# Patient Record
Sex: MALE | Race: WHITE | Employment: OTHER | URBAN - NONMETROPOLITAN AREA
[De-identification: names, ages, dates, MRNs, and addresses within clinical notes are randomized per-mention and may not be internally consistent; named-entity substitution may affect disease eponyms.]

---

## 2017-01-05 ENCOUNTER — HOSPITAL ENCOUNTER (OUTPATIENT)
Dept: GENERAL RADIOLOGY | Age: 77
Discharge: HOME | End: 2017-01-05
Payer: MEDICARE

## 2017-01-05 ENCOUNTER — HOSPITAL ENCOUNTER (OUTPATIENT)
Dept: PREADMISSION TESTING | Age: 77
Discharge: HOME | End: 2017-01-05
Payer: MEDICARE

## 2017-01-05 VITALS — HEIGHT: 72 IN | WEIGHT: 230 LBS | BODY MASS INDEX: 31.15 KG/M2

## 2017-01-05 LAB
ALBUMIN SERPL-MCNC: 4 G/DL (ref 3.5–5.2)
ALP BLD-CCNC: 104 U/L (ref 40–130)
ALT SERPL-CCNC: 15 U/L (ref 5–41)
ANION GAP SERPL CALCULATED.3IONS-SCNC: 16 MMOL/L (ref 7–19)
APTT: 27.6 SEC (ref 26–36.2)
AST SERPL-CCNC: 15 U/L (ref 5–40)
BASOPHILS ABSOLUTE: 0 K/UL (ref 0–0.2)
BASOPHILS RELATIVE PERCENT: 0.3 % (ref 0–1)
BILIRUB SERPL-MCNC: 0.4 MG/DL (ref 0.2–1.2)
BILIRUBIN URINE: NEGATIVE
BLOOD, URINE: NEGATIVE
BUN BLDV-MCNC: 21 MG/DL (ref 8–23)
CALCIUM SERPL-MCNC: 9.5 MG/DL (ref 8.8–10.2)
CHLORIDE BLD-SCNC: 98 MMOL/L (ref 98–111)
CLARITY: CLEAR
CO2: 26 MMOL/L (ref 22–29)
COLOR: YELLOW
CREAT SERPL-MCNC: 1.2 MG/DL (ref 0.5–1.2)
EOSINOPHILS ABSOLUTE: 0.3 K/UL (ref 0–0.6)
EOSINOPHILS RELATIVE PERCENT: 2.7 % (ref 0–5)
GFR NON-AFRICAN AMERICAN: 59
GLOBULIN: 3.6 G/DL
GLUCOSE BLD-MCNC: 102 MG/DL (ref 74–109)
GLUCOSE URINE: NEGATIVE MG/DL
HCT VFR BLD CALC: 37.3 % (ref 42–52)
HEMOGLOBIN: 11.6 G/DL (ref 14–18)
INR BLD: 1.05 (ref 0.88–1.18)
KETONES, URINE: NEGATIVE MG/DL
LEUKOCYTE ESTERASE, URINE: NEGATIVE
LYMPHOCYTES ABSOLUTE: 2.1 K/UL (ref 1.1–4.5)
LYMPHOCYTES RELATIVE PERCENT: 22.4 % (ref 20–40)
MCH RBC QN AUTO: 28.8 PG (ref 27–31)
MCHC RBC AUTO-ENTMCNC: 31.1 G/DL (ref 33–37)
MCV RBC AUTO: 92.6 FL (ref 80–94)
MONOCYTES ABSOLUTE: 0.6 K/UL (ref 0–0.9)
MONOCYTES RELATIVE PERCENT: 6.4 % (ref 0–10)
NEUTROPHILS ABSOLUTE: 6.4 K/UL (ref 1.5–7.5)
NEUTROPHILS RELATIVE PERCENT: 66.7 % (ref 50–65)
NITRITE, URINE: NEGATIVE
PDW BLD-RTO: 15.4 % (ref 11.5–14.5)
PH UA: 5.5
PLATELET # BLD: 288 K/UL (ref 130–400)
PMV BLD AUTO: 10.2 FL (ref 7.4–10.4)
POTASSIUM SERPL-SCNC: 4.1 MMOL/L (ref 3.5–5)
PROTEIN UA: NEGATIVE MG/DL
PROTHROMBIN TIME: 13.7 SEC (ref 12–14.6)
RBC # BLD: 4.03 M/UL (ref 4.7–6.1)
SODIUM BLD-SCNC: 140 MMOL/L (ref 136–145)
SPECIFIC GRAVITY UA: 1.02
TOTAL PROTEIN: 7.6 G/DL (ref 6.6–8.7)
UROBILINOGEN, URINE: 0.2 E.U./DL
WBC # BLD: 9.6 K/UL (ref 4.8–10.8)

## 2017-01-05 PROCEDURE — 87070 CULTURE OTHR SPECIMN AEROBIC: CPT

## 2017-01-05 PROCEDURE — 36415 COLL VENOUS BLD VENIPUNCTURE: CPT

## 2017-01-05 PROCEDURE — 80053 COMPREHEN METABOLIC PANEL: CPT

## 2017-01-05 PROCEDURE — 71020 XR CHEST STANDARD TWO VW: CPT

## 2017-01-05 PROCEDURE — 85025 COMPLETE CBC W/AUTO DIFF WBC: CPT

## 2017-01-05 PROCEDURE — 93005 ELECTROCARDIOGRAM TRACING: CPT

## 2017-01-05 PROCEDURE — 85610 PROTHROMBIN TIME: CPT

## 2017-01-05 PROCEDURE — 81003 URINALYSIS AUTO W/O SCOPE: CPT

## 2017-01-05 PROCEDURE — 85730 THROMBOPLASTIN TIME PARTIAL: CPT

## 2017-01-05 RX ORDER — DEXAMETHASONE SODIUM PHOSPHATE 10 MG/ML
10 INJECTION INTRAMUSCULAR; INTRAVENOUS ONCE
Status: CANCELLED | OUTPATIENT
Start: 2017-01-16

## 2017-01-05 RX ORDER — CELECOXIB 200 MG/1
200 CAPSULE ORAL ONCE
Status: CANCELLED | OUTPATIENT
Start: 2017-01-16

## 2017-01-05 RX ORDER — PREGABALIN 75 MG/1
75 CAPSULE ORAL ONCE
Status: CANCELLED | OUTPATIENT
Start: 2017-01-16

## 2017-01-06 LAB — MRSA CULTURE ONLY: NORMAL

## 2017-01-08 LAB
EKG P AXIS: 58 DEGREES
EKG P-R INTERVAL: 200 MS
EKG Q-T INTERVAL: 408 MS
EKG QRS DURATION: 142 MS
EKG QTC CALCULATION (BAZETT): 443 MS
EKG T AXIS: 69 DEGREES

## 2017-01-11 ENCOUNTER — CLINICAL SUPPORT (OUTPATIENT)
Dept: AUDIOLOGY | Facility: CLINIC | Age: 77
End: 2017-01-11

## 2017-01-11 DIAGNOSIS — Z46.1 ENCOUNTER FOR FITTING OR ADJUSTMENT OF HEARING AID: Primary | ICD-10-CM

## 2017-01-11 PROCEDURE — HEARINGNOCHG: Performed by: AUDIOLOGIST

## 2017-01-11 NOTE — PROGRESS NOTES
HEARING AID CHECK    Name:  Raleigh Sanz  :  1940  Age:  76 y.o.  Date of Evaluation:  2017      HISTORY    Reason for visit:  Raleigh Sanz is seen today for a hearing aid check.  Patient reports the tubing has broken on one of his ear molds.    Hearing aid history:  Patient is currently wearing a Behind the Ear (BTE) hearing aid in both ear(s).      OFFICE VISIT    During today's visit tubing was changed on both ear molds.  Hearing aids were checked and found to be in good working condition.  He will return for hearing aid assistance as necessary.    It was a pleasure seeing Raleigh Sanz in Audiology today.  It is a pleasure helping Mr. Sanz with his amplification needs.             This document has been electronically signed by Carmen Graham MS CCC-MIROSLAVA on 2017 3:09 PM       Carmen Graham MS CCC-MIROSLAVA  Licensed Audiologist    For Billing and Codin  Hearing Aid Check, binaural - no charge

## 2017-01-11 NOTE — MR AVS SNAPSHOT
Raleigh Geepkins   2017 11:30 AM   Appointment    Dept Phone:  [phone]   Encounter #:  [id number]    Provider:  Carmen Graham MS CCC-A   Department:  [facility] OTOLARYNGOLOGY                Your Full Care Plan              Your Updated Medication List          This list is accurate as of: 17 11:55 AM.  Always use your most recent med list.                allopurinol 300 MG tablet   Commonly known as:  ZYLOPRIM       aspirin 81 MG chewable tablet       carvedilol 3.125 MG tablet   Commonly known as:  COREG       indomethacin SR 75 MG CR capsule   Commonly known as:  INDOCIN SR       pantoprazole 40 MG EC tablet   Commonly known as:  PROTONIX       spironolactone 25 MG tablet   Commonly known as:  ALDACTONE       tamsulosin 0.4 MG capsule 24 hr capsule   Commonly known as:  FLOMAX       valsartan-hydrochlorothiazide 80-12.5 MG per tablet   Commonly known as:  DIOVAN-HCT               Instructions     None    Patient Instructions History      Upcoming Appointments     Visit Type Date Time Department    AID CHECK 2017 11:30 AM [facility]UDIOLOGY [facility]    OFFICE VISIT 2017  9:45 AM [facility] Signup     [facility] [employer] allows you to send messages to your doctor, view your test results, renew your prescriptions, schedule appointments, and more. To sign up, go to [employer] and click on the Sign Up Now link in the New User? box. Enter your [employer] Activation Code exactly as it appears below along with the last four digits of your Social Security Number and your Date of Birth () to complete the sign-up process. If you do not sign up before the expiration date, you must request a new code.    [employer] Activation Code: [email or web address]  Expires: 2017 11:55 AM    If you have questions, you can email [email or web address] or call [phone] to talk to our [employer] staff. Remember, [employer]  is NOT to be used for urgent needs. For medical emergencies, dial 911.               Other Info from Your Visit           Your Appointments     May 16, 2017  9:45 AM CDT   Office Visit with Stef Rosario MD   [facility] OPHTHALMOLOGY (--)    [address] [place] [address] [address]  [facility] [address] [place] [id number] [phone]           Arrive 15 minutes prior to appointment.              Allergies     Lortab [Hydrocodone-acetaminophen]  Hives    Tape      Plastic tape  Silk tape      Vital Signs     Smoking Status                   Former Smoker

## 2017-01-16 ENCOUNTER — ANESTHESIA EVENT (OUTPATIENT)
Dept: OPERATING ROOM | Age: 77
DRG: 470 | End: 2017-01-16
Payer: MEDICARE

## 2017-01-16 ENCOUNTER — ANESTHESIA (OUTPATIENT)
Dept: OPERATING ROOM | Age: 77
DRG: 470 | End: 2017-01-16
Payer: MEDICARE

## 2017-01-16 ENCOUNTER — APPOINTMENT (OUTPATIENT)
Dept: GENERAL RADIOLOGY | Age: 77
DRG: 470 | End: 2017-01-16
Attending: ORTHOPAEDIC SURGERY
Payer: MEDICARE

## 2017-01-16 VITALS — DIASTOLIC BLOOD PRESSURE: 46 MMHG | SYSTOLIC BLOOD PRESSURE: 85 MMHG | TEMPERATURE: 97 F | OXYGEN SATURATION: 96 %

## 2017-01-16 PROCEDURE — 73560 X-RAY EXAM OF KNEE 1 OR 2: CPT

## 2017-01-16 PROCEDURE — 6360000002 HC RX W HCPCS: Performed by: ORTHOPAEDIC SURGERY

## 2017-01-16 PROCEDURE — 6360000002 HC RX W HCPCS: Performed by: NURSE ANESTHETIST, CERTIFIED REGISTERED

## 2017-01-16 PROCEDURE — 76942 ECHO GUIDE FOR BIOPSY: CPT | Performed by: NURSE ANESTHETIST, CERTIFIED REGISTERED

## 2017-01-16 PROCEDURE — 64520 N BLOCK LUMBAR/THORACIC: CPT | Performed by: NURSE ANESTHETIST, CERTIFIED REGISTERED

## 2017-01-16 PROCEDURE — 2500000003 HC RX 250 WO HCPCS: Performed by: NURSE ANESTHETIST, CERTIFIED REGISTERED

## 2017-01-16 PROCEDURE — 2580000003 HC RX 258: Performed by: ORTHOPAEDIC SURGERY

## 2017-01-16 RX ORDER — DEXAMETHASONE SODIUM PHOSPHATE 10 MG/ML
INJECTION INTRAMUSCULAR; INTRAVENOUS PRN
Status: DISCONTINUED | OUTPATIENT
Start: 2017-01-16 | End: 2017-01-16 | Stop reason: SDUPTHER

## 2017-01-16 RX ORDER — TRANEXAMIC ACID 100 MG/ML
INJECTION, SOLUTION INTRAVENOUS PRN
Status: DISCONTINUED | OUTPATIENT
Start: 2017-01-16 | End: 2017-01-16 | Stop reason: SDUPTHER

## 2017-01-16 RX ORDER — FENTANYL CITRATE 50 UG/ML
INJECTION, SOLUTION INTRAMUSCULAR; INTRAVENOUS PRN
Status: DISCONTINUED | OUTPATIENT
Start: 2017-01-16 | End: 2017-01-16 | Stop reason: SDUPTHER

## 2017-01-16 RX ORDER — PROPOFOL 10 MG/ML
INJECTION, EMULSION INTRAVENOUS CONTINUOUS PRN
Status: DISCONTINUED | OUTPATIENT
Start: 2017-01-16 | End: 2017-01-16 | Stop reason: SDUPTHER

## 2017-01-16 RX ORDER — ONDANSETRON 2 MG/ML
INJECTION INTRAMUSCULAR; INTRAVENOUS PRN
Status: DISCONTINUED | OUTPATIENT
Start: 2017-01-16 | End: 2017-01-16 | Stop reason: SDUPTHER

## 2017-01-16 RX ADMIN — FENTANYL CITRATE 50 MCG: 50 INJECTION INTRAMUSCULAR; INTRAVENOUS at 10:35

## 2017-01-16 RX ADMIN — SODIUM CHLORIDE, SODIUM LACTATE, POTASSIUM CHLORIDE, AND CALCIUM CHLORIDE: 600; 310; 30; 20 INJECTION, SOLUTION INTRAVENOUS at 11:25

## 2017-01-16 RX ADMIN — TRANEXAMIC ACID 1000 MG: 100 INJECTION, SOLUTION INTRAVENOUS at 10:45

## 2017-01-16 RX ADMIN — Medication 2 G: at 11:05

## 2017-01-16 RX ADMIN — DEXAMETHASONE SODIUM PHOSPHATE 10 MG: 10 INJECTION INTRAMUSCULAR; INTRAVENOUS at 11:15

## 2017-01-16 RX ADMIN — PROPOFOL 100 MCG/KG/MIN: 10 INJECTION, EMULSION INTRAVENOUS at 10:38

## 2017-01-16 RX ADMIN — ONDANSETRON HYDROCHLORIDE 4 MG: 2 INJECTION, SOLUTION INTRAVENOUS at 12:20

## 2017-01-16 RX ADMIN — FENTANYL CITRATE 50 MCG: 50 INJECTION INTRAMUSCULAR; INTRAVENOUS at 12:40

## 2017-01-16 RX ADMIN — TRANEXAMIC ACID 1000 MG: 100 INJECTION, SOLUTION INTRAVENOUS at 12:20

## 2017-01-17 PROBLEM — N18.1 CHRONIC KIDNEY DISEASE (CKD), STAGE I: Chronic | Status: ACTIVE | Noted: 2017-01-17

## 2017-01-17 PROBLEM — I10 ESSENTIAL HYPERTENSION: Chronic | Status: ACTIVE | Noted: 2017-01-17

## 2017-01-17 PROBLEM — K59.03 CONSTIPATION DUE TO OPIOID THERAPY: Status: ACTIVE | Noted: 2017-01-17

## 2017-01-17 PROBLEM — T40.2X5A CONSTIPATION DUE TO OPIOID THERAPY: Status: ACTIVE | Noted: 2017-01-17

## 2017-01-17 PROBLEM — K21.9 GASTROESOPHAGEAL REFLUX DISEASE WITHOUT ESOPHAGITIS: Chronic | Status: ACTIVE | Noted: 2017-01-17

## 2017-01-17 PROBLEM — D50.9 IRON DEFICIENCY ANEMIA: Chronic | Status: ACTIVE | Noted: 2017-01-17

## 2017-02-01 ENCOUNTER — APPOINTMENT (OUTPATIENT)
Dept: GENERAL RADIOLOGY | Facility: HOSPITAL | Age: 77
End: 2017-02-01

## 2017-02-01 ENCOUNTER — APPOINTMENT (OUTPATIENT)
Dept: ULTRASOUND IMAGING | Facility: HOSPITAL | Age: 77
End: 2017-02-01

## 2017-02-01 ENCOUNTER — HOSPITAL ENCOUNTER (OUTPATIENT)
Facility: HOSPITAL | Age: 77
Setting detail: OBSERVATION
Discharge: HOME | End: 2017-02-04
Attending: EMERGENCY MEDICINE | Admitting: FAMILY MEDICINE

## 2017-02-01 DIAGNOSIS — J40 BRONCHITIS: Primary | ICD-10-CM

## 2017-02-01 DIAGNOSIS — M79.604 PAIN OF RIGHT LOWER EXTREMITY: ICD-10-CM

## 2017-02-01 LAB
APTT PPP: 41.1 SECONDS (ref 20–40.3)
CK MB SERPL-CCNC: 0.48 NG/ML (ref 0–5)
D-LACTATE SERPL-SCNC: 1.3 MMOL/L (ref 0.5–2)
INR PPP: 1.43 (ref 0.8–1.2)
NT-PROBNP SERPL-MCNC: 182 PG/ML (ref 0–900)
PROTHROMBIN TIME: 17.3 SECONDS (ref 11.1–15.3)
TROPONIN I SERPL-MCNC: 0.01 NG/ML

## 2017-02-01 PROCEDURE — 93010 ELECTROCARDIOGRAM REPORT: CPT | Performed by: INTERNAL MEDICINE

## 2017-02-01 PROCEDURE — 73564 X-RAY EXAM KNEE 4 OR MORE: CPT

## 2017-02-01 PROCEDURE — 83605 ASSAY OF LACTIC ACID: CPT | Performed by: EMERGENCY MEDICINE

## 2017-02-01 PROCEDURE — 71020 HC CHEST PA AND LATERAL: CPT

## 2017-02-01 PROCEDURE — 83880 ASSAY OF NATRIURETIC PEPTIDE: CPT | Performed by: EMERGENCY MEDICINE

## 2017-02-01 PROCEDURE — 87040 BLOOD CULTURE FOR BACTERIA: CPT | Performed by: EMERGENCY MEDICINE

## 2017-02-01 PROCEDURE — 85610 PROTHROMBIN TIME: CPT | Performed by: EMERGENCY MEDICINE

## 2017-02-01 PROCEDURE — 99284 EMERGENCY DEPT VISIT MOD MDM: CPT

## 2017-02-01 PROCEDURE — 84484 ASSAY OF TROPONIN QUANT: CPT | Performed by: EMERGENCY MEDICINE

## 2017-02-01 PROCEDURE — 93005 ELECTROCARDIOGRAM TRACING: CPT | Performed by: EMERGENCY MEDICINE

## 2017-02-01 PROCEDURE — 99219 PR INITIAL OBSERVATION CARE/DAY 50 MINUTES: CPT | Performed by: FAMILY MEDICINE

## 2017-02-01 PROCEDURE — 93970 EXTREMITY STUDY: CPT

## 2017-02-01 PROCEDURE — 96361 HYDRATE IV INFUSION ADD-ON: CPT

## 2017-02-01 PROCEDURE — 85730 THROMBOPLASTIN TIME PARTIAL: CPT | Performed by: EMERGENCY MEDICINE

## 2017-02-01 PROCEDURE — 96360 HYDRATION IV INFUSION INIT: CPT

## 2017-02-01 PROCEDURE — 82553 CREATINE MB FRACTION: CPT | Performed by: EMERGENCY MEDICINE

## 2017-02-01 RX ORDER — IPRATROPIUM BROMIDE AND ALBUTEROL SULFATE 2.5; .5 MG/3ML; MG/3ML
3 SOLUTION RESPIRATORY (INHALATION)
Status: DISCONTINUED | OUTPATIENT
Start: 2017-02-01 | End: 2017-02-04 | Stop reason: HOSPADM

## 2017-02-01 RX ORDER — ACETAMINOPHEN 500 MG
1000 TABLET ORAL ONCE
Status: COMPLETED | OUTPATIENT
Start: 2017-02-01 | End: 2017-02-01

## 2017-02-01 RX ADMIN — IPRATROPIUM BROMIDE AND ALBUTEROL SULFATE 3 ML: 2.5; .5 SOLUTION RESPIRATORY (INHALATION) at 22:30

## 2017-02-01 RX ADMIN — SODIUM CHLORIDE 1000 ML: 900 INJECTION, SOLUTION INTRAVENOUS at 22:29

## 2017-02-01 RX ADMIN — ACETAMINOPHEN 1000 MG: 500 TABLET ORAL at 22:37

## 2017-02-02 ENCOUNTER — APPOINTMENT (OUTPATIENT)
Dept: NUCLEAR MEDICINE | Facility: HOSPITAL | Age: 77
End: 2017-02-02

## 2017-02-02 PROBLEM — N17.9 ACUTE RENAL FAILURE (HCC): Status: ACTIVE | Noted: 2017-02-02

## 2017-02-02 PROBLEM — R79.89 ELEVATED D-DIMER: Status: ACTIVE | Noted: 2017-02-02

## 2017-02-02 PROBLEM — Z99.89 OSA ON CPAP: Status: ACTIVE | Noted: 2017-02-02

## 2017-02-02 PROBLEM — J40 BRONCHITIS: Status: ACTIVE | Noted: 2017-02-02

## 2017-02-02 PROBLEM — Z98.890 S/P KNEE SURGERY: Status: ACTIVE | Noted: 2017-02-02

## 2017-02-02 PROBLEM — A41.9 SEPSIS DUE TO PNEUMONIA (HCC): Status: ACTIVE | Noted: 2017-02-02

## 2017-02-02 PROBLEM — N40.0 BENIGN PROSTATIC HYPERPLASIA WITHOUT LOWER URINARY TRACT SYMPTOMS: Status: ACTIVE | Noted: 2017-02-02

## 2017-02-02 PROBLEM — K21.9 CHRONIC GERD: Status: ACTIVE | Noted: 2017-02-02

## 2017-02-02 PROBLEM — R06.02 SHORTNESS OF BREATH: Status: ACTIVE | Noted: 2017-02-02

## 2017-02-02 PROBLEM — G47.33 OSA ON CPAP: Status: ACTIVE | Noted: 2017-02-02

## 2017-02-02 PROBLEM — J18.9 SEPSIS DUE TO PNEUMONIA (HCC): Status: ACTIVE | Noted: 2017-02-02

## 2017-02-02 PROBLEM — M1A.9XX0 CHRONIC GOUT: Status: ACTIVE | Noted: 2017-02-02

## 2017-02-02 LAB
ALBUMIN SERPL-MCNC: 3.4 G/DL (ref 3.4–4.8)
ALBUMIN/GLOB SERPL: 1 G/DL (ref 1.1–1.8)
ALP SERPL-CCNC: 90 U/L (ref 38–126)
ALT SERPL W P-5'-P-CCNC: 24 U/L (ref 21–72)
ANION GAP SERPL CALCULATED.3IONS-SCNC: 11 MMOL/L (ref 5–15)
ANISOCYTOSIS BLD QL: ABNORMAL
AST SERPL-CCNC: 28 U/L (ref 17–59)
BACTERIA SPEC RESP CULT: NORMAL
BASOPHILS # BLD MANUAL: 0.15 10*3/MM3 (ref 0–0.2)
BASOPHILS NFR BLD AUTO: 1 % (ref 0–2)
BILIRUB SERPL-MCNC: 0.8 MG/DL (ref 0.2–1.3)
BUN BLD-MCNC: 38 MG/DL (ref 7–21)
BUN/CREAT SERPL: 24.5 (ref 7–25)
CALCIUM SPEC-SCNC: 8.9 MG/DL (ref 8.4–10.2)
CHLORIDE SERPL-SCNC: 101 MMOL/L (ref 95–110)
CO2 SERPL-SCNC: 26 MMOL/L (ref 22–31)
CREAT BLD-MCNC: 1.55 MG/DL (ref 0.7–1.3)
CRP SERPL-MCNC: 25.3 MG/DL (ref 0–1)
DEPRECATED RDW RBC AUTO: 48.4 FL (ref 35.1–43.9)
ERYTHROCYTE [DISTWIDTH] IN BLOOD BY AUTOMATED COUNT: 15.4 % (ref 11.5–14.5)
GFR SERPL CREATININE-BSD FRML MDRD: 44 ML/MIN/1.73 (ref 42–98)
GLOBULIN UR ELPH-MCNC: 3.5 GM/DL (ref 2.3–3.5)
GLUCOSE BLD-MCNC: 112 MG/DL (ref 60–100)
GRAM STN SPEC: NORMAL
HCT VFR BLD AUTO: 26.3 % (ref 39–49)
HGB BLD-MCNC: 8.9 G/DL (ref 13.7–17.3)
HYPOCHROMIA BLD QL: ABNORMAL
LYMPHOCYTES # BLD MANUAL: 1.6 10*3/MM3 (ref 0.6–4.2)
LYMPHOCYTES NFR BLD MANUAL: 11 % (ref 10–50)
LYMPHOCYTES NFR BLD MANUAL: 4 % (ref 0–12)
MCH RBC QN AUTO: 29.3 PG (ref 26.5–34)
MCHC RBC AUTO-ENTMCNC: 33.8 G/DL (ref 31.5–36.3)
MCV RBC AUTO: 86.5 FL (ref 80–98)
MONOCYTES # BLD AUTO: 0.58 10*3/MM3 (ref 0–0.9)
MYELOCYTES NFR BLD MANUAL: 1 % (ref 0–0)
NEUTROPHILS # BLD AUTO: 11.75 10*3/MM3 (ref 2–8.6)
NEUTROPHILS NFR BLD MANUAL: 80 % (ref 37–80)
NEUTS BAND NFR BLD MANUAL: 1 % (ref 0–5)
PLATELET # BLD AUTO: 398 10*3/MM3 (ref 150–450)
PMV BLD AUTO: 8.9 FL (ref 8–12)
POTASSIUM BLD-SCNC: 4.4 MMOL/L (ref 3.5–5.1)
PROT SERPL-MCNC: 6.9 G/DL (ref 6.3–8.6)
RBC # BLD AUTO: 3.04 10*6/MM3 (ref 4.37–5.74)
SMALL PLATELETS BLD QL SMEAR: ADEQUATE
SODIUM BLD-SCNC: 138 MMOL/L (ref 137–145)
VARIANT LYMPHS NFR BLD MANUAL: 2 % (ref 0–5)
WBC MORPH BLD: NORMAL
WBC NRBC COR # BLD: 14.5 10*3/MM3 (ref 3.2–9.8)

## 2017-02-02 PROCEDURE — 86140 C-REACTIVE PROTEIN: CPT | Performed by: FAMILY MEDICINE

## 2017-02-02 PROCEDURE — 93005 ELECTROCARDIOGRAM TRACING: CPT | Performed by: EMERGENCY MEDICINE

## 2017-02-02 PROCEDURE — 25010000002 AZITHROMYCIN: Performed by: EMERGENCY MEDICINE

## 2017-02-02 PROCEDURE — 0 TECHNETIUM ALBUMIN AGGREGATED: Performed by: FAMILY MEDICINE

## 2017-02-02 PROCEDURE — 80053 COMPREHEN METABOLIC PANEL: CPT | Performed by: FAMILY MEDICINE

## 2017-02-02 PROCEDURE — 87040 BLOOD CULTURE FOR BACTERIA: CPT | Performed by: EMERGENCY MEDICINE

## 2017-02-02 PROCEDURE — 78582 LUNG VENTILAT&PERFUS IMAGING: CPT

## 2017-02-02 PROCEDURE — 94799 UNLISTED PULMONARY SVC/PX: CPT

## 2017-02-02 PROCEDURE — 96365 THER/PROPH/DIAG IV INF INIT: CPT

## 2017-02-02 PROCEDURE — A9540 TC99M MAA: HCPCS | Performed by: FAMILY MEDICINE

## 2017-02-02 PROCEDURE — 0 TECHNETIUM TC 99M PENTETATE KIT: Performed by: FAMILY MEDICINE

## 2017-02-02 PROCEDURE — G0378 HOSPITAL OBSERVATION PER HR: HCPCS

## 2017-02-02 PROCEDURE — 93010 ELECTROCARDIOGRAM REPORT: CPT | Performed by: INTERNAL MEDICINE

## 2017-02-02 PROCEDURE — 96367 TX/PROPH/DG ADDL SEQ IV INF: CPT

## 2017-02-02 PROCEDURE — 25010000002 CEFTRIAXONE: Performed by: EMERGENCY MEDICINE

## 2017-02-02 PROCEDURE — 94640 AIRWAY INHALATION TREATMENT: CPT

## 2017-02-02 PROCEDURE — A9567 TECHNETIUM TC-99M AEROSOL: HCPCS | Performed by: FAMILY MEDICINE

## 2017-02-02 PROCEDURE — 94760 N-INVAS EAR/PLS OXIMETRY 1: CPT

## 2017-02-02 PROCEDURE — 87205 SMEAR GRAM STAIN: CPT | Performed by: FAMILY MEDICINE

## 2017-02-02 PROCEDURE — 85007 BL SMEAR W/DIFF WBC COUNT: CPT | Performed by: FAMILY MEDICINE

## 2017-02-02 PROCEDURE — 85025 COMPLETE CBC W/AUTO DIFF WBC: CPT | Performed by: FAMILY MEDICINE

## 2017-02-02 RX ORDER — SODIUM CHLORIDE 9 MG/ML
100 INJECTION, SOLUTION INTRAVENOUS CONTINUOUS
Status: DISCONTINUED | OUTPATIENT
Start: 2017-02-02 | End: 2017-02-04 | Stop reason: HOSPADM

## 2017-02-02 RX ORDER — PANTOPRAZOLE SODIUM 40 MG/1
40 TABLET, DELAYED RELEASE ORAL DAILY
Status: DISCONTINUED | OUTPATIENT
Start: 2017-02-02 | End: 2017-02-04 | Stop reason: HOSPADM

## 2017-02-02 RX ORDER — VALSARTAN 80 MG/1
80 TABLET ORAL
Status: DISCONTINUED | OUTPATIENT
Start: 2017-02-02 | End: 2017-02-04 | Stop reason: HOSPADM

## 2017-02-02 RX ORDER — BISACODYL 10 MG
10 SUPPOSITORY, RECTAL RECTAL DAILY PRN
Status: DISCONTINUED | OUTPATIENT
Start: 2017-02-02 | End: 2017-02-04 | Stop reason: HOSPADM

## 2017-02-02 RX ORDER — ONDANSETRON 2 MG/ML
4 INJECTION INTRAMUSCULAR; INTRAVENOUS EVERY 6 HOURS PRN
Status: DISCONTINUED | OUTPATIENT
Start: 2017-02-02 | End: 2017-02-04 | Stop reason: HOSPADM

## 2017-02-02 RX ORDER — ACETAMINOPHEN 325 MG/1
650 TABLET ORAL EVERY 4 HOURS PRN
Status: DISCONTINUED | OUTPATIENT
Start: 2017-02-02 | End: 2017-02-04 | Stop reason: HOSPADM

## 2017-02-02 RX ORDER — CYCLOBENZAPRINE HCL 5 MG
5 TABLET ORAL 3 TIMES DAILY PRN
Status: DISCONTINUED | OUTPATIENT
Start: 2017-02-02 | End: 2017-02-04 | Stop reason: HOSPADM

## 2017-02-02 RX ORDER — TAMSULOSIN HYDROCHLORIDE 0.4 MG/1
0.4 CAPSULE ORAL DAILY
Status: DISCONTINUED | OUTPATIENT
Start: 2017-02-02 | End: 2017-02-04 | Stop reason: HOSPADM

## 2017-02-02 RX ORDER — ACETAMINOPHEN 500 MG
1000 TABLET ORAL EVERY 6 HOURS PRN
COMMUNITY

## 2017-02-02 RX ORDER — ACETAMINOPHEN 500 MG
500 TABLET ORAL 3 TIMES DAILY
Status: DISCONTINUED | OUTPATIENT
Start: 2017-02-02 | End: 2017-02-04 | Stop reason: HOSPADM

## 2017-02-02 RX ORDER — SODIUM CHLORIDE 0.9 % (FLUSH) 0.9 %
1-10 SYRINGE (ML) INJECTION AS NEEDED
Status: DISCONTINUED | OUTPATIENT
Start: 2017-02-02 | End: 2017-02-04 | Stop reason: HOSPADM

## 2017-02-02 RX ORDER — HYDROCHLOROTHIAZIDE 12.5 MG/1
12.5 CAPSULE, GELATIN COATED ORAL
Status: DISCONTINUED | OUTPATIENT
Start: 2017-02-02 | End: 2017-02-04 | Stop reason: HOSPADM

## 2017-02-02 RX ORDER — ALLOPURINOL 300 MG/1
300 TABLET ORAL DAILY
Status: DISCONTINUED | OUTPATIENT
Start: 2017-02-02 | End: 2017-02-04 | Stop reason: HOSPADM

## 2017-02-02 RX ORDER — AZITHROMYCIN 250 MG/1
250 TABLET, FILM COATED ORAL
Status: DISCONTINUED | OUTPATIENT
Start: 2017-02-03 | End: 2017-02-04 | Stop reason: HOSPADM

## 2017-02-02 RX ORDER — SPIRONOLACTONE 25 MG/1
25 TABLET ORAL DAILY
Status: DISCONTINUED | OUTPATIENT
Start: 2017-02-02 | End: 2017-02-04 | Stop reason: HOSPADM

## 2017-02-02 RX ADMIN — VALSARTAN 80 MG: 80 TABLET, FILM COATED ORAL at 09:16

## 2017-02-02 RX ADMIN — TAMSULOSIN HYDROCHLORIDE 0.4 MG: 0.4 CAPSULE ORAL at 09:15

## 2017-02-02 RX ADMIN — AZITHROMYCIN 500 MG: 500 INJECTION, POWDER, LYOPHILIZED, FOR SOLUTION INTRAVENOUS at 03:05

## 2017-02-02 RX ADMIN — SPIRONOLACTONE 25 MG: 25 TABLET ORAL at 09:16

## 2017-02-02 RX ADMIN — ACETAMINOPHEN 500 MG: 500 TABLET ORAL at 21:51

## 2017-02-02 RX ADMIN — Medication 1 DOSE: at 08:27

## 2017-02-02 RX ADMIN — IPRATROPIUM BROMIDE AND ALBUTEROL SULFATE 3 ML: 2.5; .5 SOLUTION RESPIRATORY (INHALATION) at 09:56

## 2017-02-02 RX ADMIN — HYDROCHLOROTHIAZIDE 12.5 MG: 12.5 CAPSULE ORAL at 09:15

## 2017-02-02 RX ADMIN — ALLOPURINOL 300 MG: 300 TABLET ORAL at 09:15

## 2017-02-02 RX ADMIN — ACETAMINOPHEN 650 MG: 325 TABLET ORAL at 14:40

## 2017-02-02 RX ADMIN — GUAIFENESIN 200 MG: 100 SOLUTION ORAL at 03:09

## 2017-02-02 RX ADMIN — RIVAROXABAN 10 MG: 10 TABLET, FILM COATED ORAL at 09:16

## 2017-02-02 RX ADMIN — IPRATROPIUM BROMIDE AND ALBUTEROL SULFATE 3 ML: 2.5; .5 SOLUTION RESPIRATORY (INHALATION) at 21:13

## 2017-02-02 RX ADMIN — CEFTRIAXONE 1 G: 1 INJECTION, POWDER, FOR SOLUTION INTRAMUSCULAR; INTRAVENOUS at 01:00

## 2017-02-02 RX ADMIN — SODIUM CHLORIDE 100 ML/HR: 900 INJECTION, SOLUTION INTRAVENOUS at 14:40

## 2017-02-02 RX ADMIN — KIT FOR THE PREPARATION OF TECHNETIUM TC 99M PENTETATE 1 DOSE: 20 INJECTION, POWDER, LYOPHILIZED, FOR SOLUTION INTRAVENOUS; RESPIRATORY (INHALATION) at 07:43

## 2017-02-02 RX ADMIN — PANTOPRAZOLE SODIUM 40 MG: 40 TABLET, DELAYED RELEASE ORAL at 09:15

## 2017-02-02 RX ADMIN — SODIUM CHLORIDE 100 ML/HR: 900 INJECTION, SOLUTION INTRAVENOUS at 02:22

## 2017-02-02 RX ADMIN — CYCLOBENZAPRINE HYDROCHLORIDE 5 MG: 5 TABLET, FILM COATED ORAL at 21:51

## 2017-02-02 RX ADMIN — ACETAMINOPHEN 500 MG: 500 TABLET ORAL at 06:25

## 2017-02-02 RX ADMIN — IPRATROPIUM BROMIDE AND ALBUTEROL SULFATE 3 ML: 2.5; .5 SOLUTION RESPIRATORY (INHALATION) at 13:43

## 2017-02-02 NOTE — PLAN OF CARE
Problem: Patient Care Overview (Adult)  Goal: Plan of Care Review  Outcome: Ongoing (interventions implemented as appropriate)    02/02/17 0411   Coping/Psychosocial Response Interventions   Plan Of Care Reviewed With patient;spouse   Patient Care Overview   Progress progress toward functional goals as expected   Outcome Evaluation   Outcome Summary/Follow up Plan Antibiotics started, cought meds q4       Goal: Adult Individualization and Mutuality  Outcome: Ongoing (interventions implemented as appropriate)  Goal: Discharge Needs Assessment  Outcome: Ongoing (interventions implemented as appropriate)

## 2017-02-02 NOTE — H&P
CHIEF COMPLAINT:  Weakness, cough, hypertension.      HISTORY OF PRESENT ILLNESS:  The patient is a 76-year-old male who recently underwent right knee surgery in [place].  He was convalescing at home and when he started feeling weak.  Weakness progressed over a 24-hour period.  He came to the local urgent care, was evaluated and found to be hypotensive.  Additionally a D-dimer was drawn at the urgent care and it was found to be markedly elevated in the 2400 range.  He was transported to the [facility] Emergency Department for further evaluation.  Also he had felt febrile, had been coughing up some yellow sputum for several days.  Given his hypotension and weakness, it was decided it would be best to admit him.  He was given a bolus of normal saline in the ED and he became normotensive and felt somewhat better.      ALLERGIES:    1.   LORTAB.   2.   PLASTIC TAPE.      MEDICATIONS:    1.   Tylenol 500 mg 1 p.o. q.6 h.   2.   Allopurinol 300 mg p.o. every day.  3.   Coreg 3.125 mg p.o. b.i.d.   4.   Indomethacin 75 mg p.o. every day.   5.   Protonix 40 mg p.o. every day.   6.   Xarelto 10 mg p.o. every day.   7.   Aldactone 25 mg p.o. every day.   8.   Flomax 0.4 mg p.o. every day.   9.   Diovan/hydrochlorothiazide 80/12.5 mg p.o. every day.   10. Aspirin 81 mg p.o. every day.     PAST MEDICAL HISTORY:    1.   Astigmatism.   2.   Glaucoma.   3.   Calcaneal spur.   4.   Carpal tunnel syndrome.   5.   Conjunctivitis.   6.   Coronary artery disease.   7.   Unknown critical microvascular disease.   8.   Degenerative joint disease.   9.   Cholelithiasis.   10. Diverticulitis.   11. Hypertension.   12. GERD.   13. Hearing loss.   14. Hyperlipidemia.   15. Incisional hernia.   16. Onychomycosis.     PAST SURGICAL HISTORY:    1.   Cardiac catheterization.   2.   Carpal tunnel syndrome.   3.   Right and left knee arthroplasty.   4.   Exploratory laparotomy.   5.   Colostomy with reversal.     SOCIAL HISTORY:   No tobacco, alcohol or drugs. He retired  for [employer].      FAMILY HISTORY:  Positive for cancer, diabetes, heart disease, hypertension, thyroid disease.      REVIEW OF SYSTEMS:    GENERAL:  Positive subjective fevers, no chills, no weight loss, positive fatigue.    RESPIRATORY:  Positive cough, with sputum production.  No hemoptysis.   CARDIAC:  No chest pain but does describe subjective tachycardia and palpitations.   LYMPHATICS:  No cervical, inguinal or axillary lymphadenopathy.    ENDOCRINE:  No polydipsia, no polyphagia, no polyuria.   HEMATOPOIETIC:  No easy bruising or bleeding.    GASTROINTESTINAL:  No nausea, vomiting, hematochezia, melena.   MUSCULOSKELETAL:  Chronic hip and knee pain.   INTEGUMENTARY:  No breakdown, no lesions, no rash.    NEUROLOGICAL:  No seizures.  No paresthesias, no weakness.    PSYCHIATRIC:  No anxiety or depression.    PHYSICAL EXAMINATION:    VITAL SIGNS:  Temperature 99.4, pulse 103, respirations 20, blood pressure 130/56.    GENERAL:  Well-developed, well-nourished elderly white male in no acute distress.    HEENT:  Head normocephalic, oropharynx is free of erythema or exudates.  TMs are shiny bilaterally.   NECK:  Free of masses, adenopathy or goiter.  Trachea in the midline.   LUNGS:  Good excursion bilaterally, but there is some crackles noted in the left lower lung.  There are also some expiratory wheezes.    HEART:  Tachycardiac with no murmur.    ABDOMEN:  Soft with no guarding, rebound tenderness or masses.  There is evidence of prior surgery as described by patient.    MUSCULOSKELETAL:  There is a healing scar noted to the right knee with confirmatory for recent right total knee replacement.  There is also scar to the left knee with old knee surgery there as well.    LYMPHATICS:  No cervical, inguinal or axillary lymphadenopathy.    GENITOURINARY:  No flank tenderness, no suprapubic tenderness.    NEUROLOGICAL:  He is alert and oriented x3.  But  somewhat sluggish.    INTEGUMENTARY:  No breakdown, no rash, no lesions.      DIAGNOSTIC DATA:  Blood cultures were obtained.  Coags show a prothrombin time of 17.3, INR 1.43, lactate 1.3.  Chest x-ray was normal.  X-ray of the right knee showed right total knee arthroplasty.  CMP showed a glucose of 112.  BUN and creatinine of 31 and 1.55.  CBC showed a white count of 14.5, hemoglobin 8.9 and hematocrit 26.3.      IMPRESSION:    1.   Bronchitis.    2.   Anemia likely due to recent right total knee surgery.   3.   Acute kidney injury.   4.   Elevated D-dimer concerning for pulmonary embolism.     PLAN:  The patient was given a bolus of normal saline in the ED until he became normotensive.  He was started on empiric antibiotic therapy for his bronchitis.  Given his worsening renal status, he was scheduled for a VQ scan in the morning of 02/02/2017.  His electrolytes and renal status will be monitored.  He will continue on Pradaxa for now until a PE is either ruled out or confirmed.  I have discussed this case with the family medicine resident and agree with the assessment and plan.            CC:           Cyrus Cobian DO  Dictation Date/Time:      02/02/2017 12:17:22  (Eastern Time Zone)  Transcribed Date/Time: 02/02/2017 14:00:14 (Eastern Time Zone)  Dictator/ Initials:   VIBHA/lam  Document ID:                [id number]  Job ID: [id number]

## 2017-02-02 NOTE — ED PROVIDER NOTES
Subjective   HPI Comments: 76 years old with h/o CAD, HTN, Gout, GERD, right TKA 1/16/17 on xarelto  Is here with generalized weakness and fatigue along with the cough prooductive of yellow green sputum. He also reports having low BP this afternoon 80/50mmHg this afternoon when he was feeling weak and tire. He was seen at [facility] where had elevated white count and Ddimers and sent in here for CTA . Has  Chronic kidney disease with elevated creatinine. BP at [facility] was 101 systolic while in here is in 131/98 without any fluids or meds.    Patient is a 76 y.o. male presenting with cough.   History provided by:  Patient  Cough   Cough characteristics:  Productive  Sputum characteristics:  Yellow  Severity:  Moderate  Onset quality:  Gradual  Duration:  5 days  Timing:  Intermittent  Progression:  Worsening  Chronicity:  New  Smoker: no    Context: sick contacts    Relieved by:  Cough suppressants  Worsened by:  Nothing  Ineffective treatments:  None tried  Associated symptoms: chills and myalgias    Associated symptoms: no chest pain, no fever, no headaches, no rash, no rhinorrhea, no shortness of breath, no sinus congestion, no sore throat and no wheezing        Review of Systems   Constitutional: Positive for chills. Negative for activity change and fever.   HENT: Negative for congestion, facial swelling, rhinorrhea, sinus pressure and sore throat.    Eyes: Negative for photophobia and visual disturbance.   Respiratory: Positive for cough. Negative for chest tightness, shortness of breath and wheezing.    Cardiovascular: Negative for chest pain.   Gastrointestinal: Negative for abdominal distention, abdominal pain, diarrhea, nausea and vomiting.   Endocrine: Negative for polyuria.   Genitourinary: Negative for flank pain.   Musculoskeletal: Positive for myalgias. Negative for back pain, joint swelling and neck pain.   Skin: Negative for rash.   Neurological: Negative for seizures, numbness and headaches.    Hematological: Negative for adenopathy.   Psychiatric/Behavioral: Negative for agitation.       Past Medical History   Diagnosis Date   • Achilles tendinitis    • Acute atopic conjunctivitis    • Astigmatism      refractive change     • Atopic conjunctivitis    • Borderline glaucoma    • Calcaneal spur    • Cardiomyopathy    • Carpal tunnel syndrome    • Conjunctivitis      allergic   • Coronary arteriosclerosis    • Cortical senile cataract      mild   • Degenerative joint disease      involving multiple joints    • Disease of gallbladder       s/p open jose for gangrenous cholecystitis   • Diverticular disease of colon       s/p hartmans and now take down colostomy      • Essential hypertension    • Foot pain      history of stress fracture right foot      • GERD (gastroesophageal reflux disease)    • Gouty arthropathy    • Hearing loss      wears hearing aids      • Heel pain      posterior   • History of colonic polyps    • History of echocardiogram 09/03/2014     Echocardiogram W/ color flow 79209 (Mild CLVH. Decreased LV systolic function with EF of 35%. Mitral and AV mildly thickened.Aortic sclerosis.Tricuspid intact.)   • History of echocardiogram 02/26/2010     Echocadiogram W/O color flow 92675 (Global hypokinesis with EF 35%. Left atrium appears to be dilated. Aortic root normal. Early diastolic dysfunctin. Mild mitral & tricuspid regurgitation. Mild pulmonic egurgitation. RV pressure approx. 30-35 mmHg.)   • Hyperlipidemia    • Incisional hernia      s/p repair with mesh    • Keratoconjunctivitis sicca    • Myopia    • Onychogryposis    • Onycholysis    • Onychomycosis    • Pain in toe    • Plantar fasciitis      chronic   • Presbyopia    • Sleep apnea    • Vitreous detachment      history of posterior right eye      • Vitreous floaters      history of right eye       Allergies   Allergen Reactions   • Lortab [Hydrocodone-Acetaminophen] Hives   • Tape      Plastic tape  Silk tape       Past Surgical  History   Procedure Laterality Date   • Cardiac catheterization  05/22/2015     Cardiac cath 87374 (Negative FFR evaluation with a value 0.90 which was not hemdynamically significant with symptoms of chest discomfort.)   • Cardiac catheterization  07/09/2010     Cardiac cath 05492 (Small caliber 3rd obtuse marginal branch which was subtotally occluded and filling in through left-to-left collateral. Proximal LAD with 30% stenosis. lst diagonal branch with 40-50% stenosis with evidence of good BOBY 3 flow. Diffusely diseased RCA)   • Carpal tunnel release  11/21/2003     Carpal tunnel surgery (Bilateral carpal tunnel release, endoscopic.)   • Cholecystectomy     • Colonoscopy  12/02/2015     Colonoscopy remove polyps 69990 (A single polyp was found in the ascending and the transverse colon;removed by hot biopsy polypectomy.)   • Colonoscopy  12/03/2014     Colonoscopy remove polyps 04442 (A single polyp was found in the cecum. Removed by hot biopsy polypectomy. Single polyp in the transverse colon.Removed by hot biopsy polypectomy.Diverticulosis found in the desc.colon.Rectal stump and distal sig.)   • Colostomy  12/04/2014     Descending colostomy takedown with primary anastomosis.   • Cystoscopy  12/04/2014     Cystoscopy (With bilateral retrogrades. Right double J stent placement. Ureteroscopy on left.)   • Other surgical history  01/22/2014   • Other surgical history       DEBRIDE NAIL 6 OR MORE 76657 (Onychogryposis, Pain in toe, Onychomycosis) (6): 04/24/2015, 01/25/2015, 10/21/2014, 04/22/2014, 01/22/2014   • Other surgical history  04/29/2014     EXTENDED VISUAL FIELDS STUDY 20249 (Borderline glaucoma)    • Knee arthroscopy  07/17/1997     Knee arthroscopy, surgery (Medial meniscectomy. Arthritis, medial compartment; torn medial meniscus.)   • Exploratory laparotomy  09/03/2014     sigmoid colectomy with descending colostomy or Fitz's procedure. Incidental appendectomy.   • Other surgical history       OCT  DISC NFL 52219 (Borderline glaucoma) (2): 05/06/2015, 04/24/2013   • Joint replacement Left 11/18/2016   • Joint replacement Right Jannuary 16, 2017     Knee       Family History   Problem Relation Age of Onset   • Cancer Other    • Diabetes Other    • Heart disease Other    • Hypertension Other    • Thyroid disease Other        Social History     Social History   • Marital status:      Spouse name: N/A   • Number of children: N/A   • Years of education: N/A     Social History Main Topics   • Smoking status: Former Smoker   • Smokeless tobacco: None   • Alcohol use No   • Drug use: No   • Sexual activity: Not Asked     Other Topics Concern   • None     Social History Narrative   • None           Objective   Physical Exam   Constitutional: He is oriented to person, place, and time. He appears well-developed and well-nourished. No distress.   HENT:   Head: Normocephalic and atraumatic.   Eyes: Conjunctivae and EOM are normal. Pupils are equal, round, and reactive to light.   Neck: Normal range of motion. Neck supple.   Cardiovascular: Normal rate, regular rhythm and normal heart sounds.    Pulmonary/Chest: Effort normal and breath sounds normal. No respiratory distress. He has no wheezes.   Abdominal: Soft. Bowel sounds are normal. He exhibits no distension. There is no tenderness. There is no rebound and no guarding.   Musculoskeletal: He exhibits no edema or deformity.        Right shoulder: He exhibits decreased range of motion, tenderness, bony tenderness, swelling and pain. He exhibits no deformity.   Neurological: He is alert and oriented to person, place, and time.   Skin: Skin is warm and dry. No rash noted.   Psychiatric: He has a normal mood and affect.   Nursing note and vitals reviewed.      ECG 12 Lead    Date/Time: 2/1/2017 10:32 PM  Performed by: BERNARD ALVES  Authorized by: BERNARD ALVES   Interpreted by physician  Rhythm: sinus tachycardia  Ectopy: PVCs  Rate: tachycardic  QRS axis:  left  Conduction: left bundle branch block and non-specific intraventricular conduction delay  Clinical impression: abnormal ECG               ED Course  ED Course   Value Comment By Time   CBC & Differential (Reviewed) Jamie Morrison MD 02/01 2313    Has elevated white count , US bilateral LE is negative. Cant do the CTA , will get the VQ scan done. Is on xarelto , which will be continued, has bronchitis fo rwhich treatment is given .  Pressure is better . D/w  and resident  and is admitted . Jamie Morrison MD 02/02 0010          Labs Reviewed   PROTIME-INR - Abnormal; Notable for the following:        Result Value    Protime 17.3 (*)     INR 1.43 (*)     All other components within normal limits    Narrative:     Therapeutic range for most indications is 2.0-3.0 INR,  or 2.5-3.5 for mechanical heart valves.   APTT - Abnormal; Notable for the following:     PTT 41.1 (*)     All other components within normal limits    Narrative:     The recommended Heparin therapeutic range is 68-97 seconds.   CBC WITH AUTO DIFFERENTIAL - Abnormal; Notable for the following:     WBC 14.50 (*)     RBC 3.04 (*)     Hemoglobin 8.9 (*)     Hematocrit 26.3 (*)     RDW 15.4 (*)     RDW-SD 48.4 (*)     All other components within normal limits   COMPREHENSIVE METABOLIC PANEL - Abnormal; Notable for the following:     Glucose 112 (*)     BUN 38 (*)     Creatinine 1.55 (*)     A/G Ratio 1.0 (*)     All other components within normal limits    Narrative:     The MDRD GFR formula is only valid for adults with stable renal function between ages 18 and 70.   C-REACTIVE PROTEIN - Abnormal; Notable for the following:     C-Reactive Protein 25.30 (*)     All other components within normal limits   COMPREHENSIVE METABOLIC PANEL - Abnormal; Notable for the following:     Glucose 112 (*)     ALT (SGPT) 20 (*)     A/G Ratio 1.0 (*)     All other components within normal limits    Narrative:     The MDRD GFR formula is only valid for  adults with stable renal function between ages 18 and 70.   CBC WITH AUTO DIFFERENTIAL - Abnormal; Notable for the following:     WBC 11.09 (*)     RBC 2.94 (*)     Hemoglobin 8.5 (*)     Hematocrit 25.6 (*)     RDW 15.3 (*)     RDW-SD 49.1 (*)     Immature Grans % 1.3 (*)     Immature Grans, Absolute 0.14 (*)     All other components within normal limits   COMPREHENSIVE METABOLIC PANEL - Abnormal; Notable for the following:     Glucose 117 (*)     ALT (SGPT) 19 (*)     All other components within normal limits    Narrative:     The MDRD GFR formula is only valid for adults with stable renal function between ages 18 and 70.   CBC WITH AUTO DIFFERENTIAL - Abnormal; Notable for the following:     RBC 2.86 (*)     Hemoglobin 8.2 (*)     Hematocrit 25.0 (*)     RDW 15.3 (*)     RDW-SD 49.1 (*)     Lymphocyte % 9.2 (*)     Immature Grans % 1.2 (*)     Immature Grans, Absolute 0.11 (*)     All other components within normal limits   C-REACTIVE PROTEIN - Abnormal; Notable for the following:     C-Reactive Protein 22.00 (*)     All other components within normal limits   MANUAL DIFFERENTIAL - Abnormal; Notable for the following:     Myelocyte % 1.0 (*)     Neutrophils Absolute 11.75 (*)     All other components within normal limits   BLOOD CULTURE - Normal   BLOOD CULTURE - Normal   TROPONIN (IN-HOUSE) - Normal   CK MB - Normal   LACTIC ACID, PLASMA - Normal   BNP (IN-HOUSE) - Normal   RESPIRATORY CULTURE   RESPIRATORY CULTURE   CBC AND DIFFERENTIAL    Narrative:     The following orders were created for panel order CBC & Differential.  Procedure                               Abnormality         Status                     ---------                               -----------         ------                     Manual Differential[[id number]]           Abnormal            Final result               CBC Auto Differential[[id number]]         Abnormal            Final result                 Please view results for these tests on the  individual orders.   CBC AND DIFFERENTIAL    Narrative:     The following orders were created for panel order CBC & Differential.  Procedure                               Abnormality         Status                     ---------                               -----------         ------                     CBC Auto Differential[[id number]]         Abnormal            Final result                 Please view results for these tests on the individual orders.   CBC AND DIFFERENTIAL    Narrative:     The following orders were created for panel order CBC & Differential.  Procedure                               Abnormality         Status                     ---------                               -----------         ------                     CBC Auto Differential[[id number]]         Abnormal            Final result                 Please view results for these tests on the individual orders.       Xr Chest 2 View    Result Date: 2/2/2017  Narrative: Exam: PA lateral chest Indication: Cough Comparison: 5/20/2015 Findings: PA lateral chest. Mild thoracic spondylosis. The cardiomediastinal silhouette is unremarkable. Lungs are clear. No pneumothorax or pleural effusion.     Impression: Impression: No acute cardiopulmonary abnormality. Electronically signed by:  Rogelio Israel MD  2/2/2017 12:01 AM CST     Xr Knee 4+ View Right    Result Date: 2/2/2017  Narrative: Exam: Right knee three views Indication: Pain Findings: Three views. Status post total right knee arthroplasty. No acute fracture or evidence of prostatic loosening. No lytic or destructive lesion. A joint effusion is present.     Impression: Impression: No acute bony abnormality. Electronically signed by:  Rogelio Israel MD  2/2/2017 12:03 AM CST     Nm Lung Ventilation Perfusion    Result Date: 2/2/2017  Narrative: [facility] [place] Patient Name: BOBY OLEA ORDERING: MABEL THOMPSON ATTENDING: MAYTE SILVA REFERRING: MABEL THOMPSON  ----------------------- PROCEDURE: V/Q scan on 2/2/2017 CLINICAL INDICATION:  Elevated d-dimer, shortness of breath COMPARISON: Chest x-ray from 2/1/2017 FINDINGS: Following the oral inhalation of 32.7 mCi of technetium 99m-DTPA aerosolized, multiple projections of the chest are obtained. Then following the intravenous administration of 6.4 mCi of technetium 99m-MAA, the same multiple projections of the chest are obtained. There is some central trapping of tracer on ventilation imaging. No significant perfusion defect is noted. There is no area of perfusion that is worse than ventilation.     Impression: CONCLUSION: Low probability for pulmonary embolus. Electronically signed by:  Lexx Segura MD  2/2/2017 12:13 PM CST               [facility]    Final diagnoses:   Bronchitis   Pain of right lower extremity            Jamie Morrison MD  02/04/17 2001

## 2017-02-02 NOTE — PLAN OF CARE
Problem: Patient Care Overview (Adult)  Goal: Plan of Care Review  Outcome: Ongoing (interventions implemented as appropriate)    02/02/17 2713   Coping/Psychosocial Response Interventions   Plan Of Care Reviewed With patient   Patient Care Overview   Progress no change   Outcome Evaluation   Outcome Summary/Follow up Plan Vitals stable, no new concerns, pain controlled.        Goal: Adult Individualization and Mutuality  Outcome: Ongoing (interventions implemented as appropriate)  Goal: Discharge Needs Assessment  Outcome: Ongoing (interventions implemented as appropriate)    Problem: COPD, Chronic Bronchitis/Emphysema (Adult)  Goal: Signs and Symptoms of Listed Potential Problems Will be Absent or Manageable (COPD, Chronic Bronchitis/Emphysema)  Outcome: Ongoing (interventions implemented as appropriate)

## 2017-02-02 NOTE — PLAN OF CARE
Problem: Pressure Ulcer Risk (Kendall Scale) (Adult,Obstetrics,Pediatric)  Goal: Identify Related Risk Factors and Signs and Symptoms  Outcome: Ongoing (interventions implemented as appropriate)  Goal: Skin Integrity  Outcome: Ongoing (interventions implemented as appropriate)

## 2017-02-02 NOTE — PROGRESS NOTES
I have seen the patient and reviewed his labs from admission. I will be taking over care of the patient.          This document has been electronically signed by Halima Pink MD on February 2, 2017 7:24 AM

## 2017-02-02 NOTE — PROGRESS NOTES
Discharge Planning Assessment  [facility]     Patient Name: Raleigh Sanz  MRN: [id number]  Today's Date: 2/2/2017    Admit Date: 2/1/2017          Discharge Needs Assessment       02/02/17 1252    Living Environment    Lives With spouse    Living Arrangements house    Provides Primary Care For no one    Primary Care Provided By other (see comments)    Quality Of Family Relationships supportive    Able to Return to Prior Living Arrangements yes    Discharge Needs Assessment    Concerns To Be Addressed no discharge needs identified    Readmission Within The Last 30 Days current reason for admission unrelated to previous admission    Anticipated Changes Related to Illness none    Equipment Currently Used at Home walker, rolling    Equipment Needed After Discharge none    Transportation Available family or friend will provide;car    Current Discharge Risk physical impairment;dependent with mobility/activities of daily living    Discharge Disposition home or self-care            Discharge Plan       02/02/17 1255    Case Management/Social Work Plan    Plan plan to return home with spouse.    Additional Comments Pt had knee replacement 1/16/2016 at [facility] by Dr Maxwell. Family is providing PT at  home because Dr Maxwell does not want Home Health for pt unless provided by [facility]. Pt has [facility] family support.        Discharge Placement     No information found                Demographic Summary       02/02/17 1249    Referral Information    Admission Type observation    Arrived From home or self-care    Referral Source physician    Reason For Consult discharge planning    Primary Care Physician Information    Name gilberto menjivar            Functional Status       02/02/17 1250    Functional Status Current    Ambulation 0-->independent    Transferring 0-->independent    Toileting 0-->independent    Bathing 0-->independent    Dressing 0-->independent    Eating 0-->independent    Communication  0-->understands/communicates without difficulty    Swallowing (if score 2 or more for any item, consult Rehab Services) 0-->swallows foods/liquids without difficulty    Functional Status Prior    Ambulation 0-->independent    Transferring 0-->independent    Toileting 0-->independent    Bathing 0-->independent    Dressing 0-->independent    Eating 0-->independent    Communication 0-->understands/communicates without difficulty    Swallowing 0-->swallows foods/liquids without difficulty    IADL    Medications independent    Meal Preparation independent    Housekeeping independent    Laundry independent    Shopping independent    Oral Care independent    Activity Tolerance    Usual Activity Tolerance good    Current Activity Tolerance other (see comments)    Cognitive/Perceptual/Developmental    Current Mental Status/Cognitive Functioning no deficits noted    Recent Changes in Mental Status/Cognitive Functioning no changes    Employment/Financial    Source Of Income pension/[facility]            Psychosocial     None            Abuse/Neglect     None            Legal     None            Substance Abuse     None            Patient Forms     None          Michelle Doyle

## 2017-02-02 NOTE — H&P
FAMILY MEDICINE HISTORY AND PHYSICAL  NAME: Raleigh Sanz  : 1940  MRN: [id number]    DATE OF ADMISSION: 17    DATE & TIME SEEN: 17 1:45 AM    PCP: Felicitas Brush DO    CHIEF COMPLAINT Cough, dyspnea, weakness    HPI:  Raleigh Sanz is a 76 y.o. male who presents with weakness, cough, and shortness of breath. Patient had a total left knee reconstruction on 2016, and a total right knee on 2017 done by Dr. Maxwell in [place]. Patient states that he has been recovering well, however, for the last few days this week, his BP at home has been found to be lower than usual, in the 80s/50s and he has been coughing up yellow sputum since Friday of this past week. He also states he has been feeling fatigue and weakness for the last few days. He states that he went to [facility] today and a PA there told him it was best for him to come to the ER here for further evaluation after a CBC showed an elevated WBC count, and a D-dimer was elevated with the addition of an elevated creatinine of 1.8. Patient denies having had any fevers or any other associated symptoms. After speaking to his cardiologist, Dr. Albert, this morning, he was advised to stop his Coreg for the time being until his BP began to rise again and normalize. Patient is in no acute distress and has no other complaints or concerns today.    CONCURRENT MEDICAL HISTORY:  Past Medical History   Diagnosis Date   • Achilles tendinitis    • Acute atopic conjunctivitis    • Astigmatism      refractive change     • Atopic conjunctivitis    • Borderline glaucoma    • Calcaneal spur    • Cardiomyopathy    • Carpal tunnel syndrome    • Conjunctivitis      allergic   • Coronary arteriosclerosis    • Cortical senile cataract      mild   • Degenerative joint disease      involving multiple joints    • Disease of gallbladder       s/p open jose for gangrenous cholecystitis   • Diverticular disease of colon       s/p  hartmans and now take down colostomy      • Essential hypertension    • Foot pain      history of stress fracture right foot      • GERD (gastroesophageal reflux disease)    • Gouty arthropathy    • Hearing loss      wears hearing aids      • Heel pain      posterior   • History of colonic polyps    • History of echocardiogram 09/03/2014     Echocardiogram W/ color flow 11031 (Mild CLVH. Decreased LV systolic function with EF of 35%. Mitral and AV mildly thickened.Aortic sclerosis.Tricuspid intact.)   • History of echocardiogram 02/26/2010     Echocadiogram W/O color flow 73593 (Global hypokinesis with EF 35%. Left atrium appears to be dilated. Aortic root normal. Early diastolic dysfunctin. Mild mitral & tricuspid regurgitation. Mild pulmonic egurgitation. RV pressure approx. 30-35 mmHg.)   • Hyperlipidemia    • Incisional hernia      s/p repair with mesh    • Keratoconjunctivitis sicca    • Myopia    • Onychogryposis    • Onycholysis    • Onychomycosis    • Pain in toe    • Plantar fasciitis      chronic   • Presbyopia    • Sleep apnea    • Vitreous detachment      history of posterior right eye      • Vitreous floaters      history of right eye       PAST SURGICAL HISTORY:  Past Surgical History   Procedure Laterality Date   • Cardiac catheterization  05/22/2015     Cardiac cath 71733 (Negative FFR evaluation with a value 0.90 which was not hemdynamically significant with symptoms of chest discomfort.)   • Cardiac catheterization  07/09/2010     Cardiac cath 11777 (Small caliber 3rd obtuse marginal branch which was subtotally occluded and filling in through left-to-left collateral. Proximal LAD with 30% stenosis. lst diagonal branch with 40-50% stenosis with evidence of good BOBY 3 flow. Diffusely diseased RCA)   • Carpal tunnel release  11/21/2003     Carpal tunnel surgery (Bilateral carpal tunnel release, endoscopic.)   • Cholecystectomy     • Colonoscopy  12/02/2015     Colonoscopy remove polyps 11991 (A  single polyp was found in the ascending and the transverse colon;removed by hot biopsy polypectomy.)   • Colonoscopy  12/03/2014     Colonoscopy remove polyps 09543 (A single polyp was found in the cecum. Removed by hot biopsy polypectomy. Single polyp in the transverse colon.Removed by hot biopsy polypectomy.Diverticulosis found in the desc.colon.Rectal stump and distal sig.)   • Colostomy  12/04/2014     Descending colostomy takedown with primary anastomosis.   • Cystoscopy  12/04/2014     Cystoscopy (With bilateral retrogrades. Right double J stent placement. Ureteroscopy on left.)   • Other surgical history  01/22/2014   • Other surgical history       DEBRIDE NAIL 6 OR MORE 42550 (Onychogryposis, Pain in toe, Onychomycosis) (6): 04/24/2015, 01/25/2015, 10/21/2014, 04/22/2014, 01/22/2014   • Other surgical history  04/29/2014     EXTENDED VISUAL FIELDS STUDY 03934 (Borderline glaucoma)    • Knee arthroscopy  07/17/1997     Knee arthroscopy, surgery (Medial meniscectomy. Arthritis, medial compartment; torn medial meniscus.)   • Exploratory laparotomy  09/03/2014     sigmoid colectomy with descending colostomy or Fitz's procedure. Incidental appendectomy.   • Other surgical history       OCT DISC NFL 93301 (Borderline glaucoma) (2): 05/06/2015, 04/24/2013   • Joint replacement Left 11/18/2016   • Joint replacement Right Jannuary 16, 2017     Knee       FAMILY HISTORY:  Family History   Problem Relation Age of Onset   • Cancer Other    • Diabetes Other    • Heart disease Other    • Hypertension Other    • Thyroid disease Other         SOCIAL HISTORY:  Social History     Social History   • Marital status:      Spouse name: N/A   • Number of children: N/A   • Years of education: N/A     Occupational History   • Not on file.     Social History Main Topics   • Smoking status: Former Smoker   • Smokeless tobacco: Not on file   • Alcohol use No   • Drug use: No   • Sexual activity: Not on file     Other Topics  Concern   • Not on file     Social History Narrative   • No narrative on file       HOME MEDICATIONS:  Prescriptions Prior to Admission   Medication Sig Dispense Refill Last Dose   • acetaminophen (TYLENOL) 500 MG tablet Take 1,000 mg by mouth Every 6 (Six) Hours As Needed for mild pain (1-3).   2/1/2017 at Unknown time   • allopurinol (ZYLOPRIM) 300 MG tablet Take 300 mg by mouth Daily.   2/1/2017 at Unknown time   • carvedilol (COREG) 3.125 MG tablet Take 3.125 mg by mouth 2 (Two) Times a Day With Meals.   2/1/2017 at Unknown time   • indomethacin SR (INDOCIN SR) 75 MG CR capsule Take 75 mg by mouth Daily.   Past Week at Unknown time   • pantoprazole (PROTONIX) 40 MG EC tablet Take 40 mg by mouth Daily.   2/1/2017 at Unknown time   • rivaroxaban (XARELTO) 10 MG tablet Take 10 mg by mouth Daily.   2/1/2017 at Unknown time   • spironolactone (ALDACTONE) 25 MG tablet Take 25 mg by mouth Daily.   Past Week at Unknown time   • tamsulosin (FLOMAX) 0.4 MG capsule 24 hr capsule Take 1 capsule by mouth Daily.   2/1/2017 at Unknown time   • valsartan-hydrochlorothiazide (DIOVAN-HCT) 80-12.5 MG per tablet Take 1 tablet by mouth Daily.   2/1/2017 at Unknown time   • aspirin 81 MG chewable tablet Chew 81 mg Daily.   More than a month at Unknown time       ALLERGIES:  Lortab [hydrocodone-acetaminophen] and Tape    REVIEW OF SYSTEMS  Review of Systems  General:negative for - chills, fever, hot flashes, malaise, night sweats, weight gain or weight loss. Positive for fatigue and generalized weakness.  Psychological: negative for - anxiety, depression, sleep disturbances or suicidal ideation  Ophthalmic: negative for - blurry vision or loss of vision  ENT: negative for - hearing change, nasal congestion or sore throat  Hematological and Lymphatic: negative for - jaundice  Endocrine: negative for - hair pattern changes, skin changes or temperature intolerance  Respiratory: no wheezing. Positive for cough productive of yellow sputum  for about 6 days. Positive for dyspnea.  Cardiovascular: no chest pain, edema or dyspnea on exertion  Gastrointestinal: no  Nausea/vomiting, abdominal pain, change in bowel habits, or black or bloody stools  Genito-Urinary: no dysuria, trouble voiding, or hematuria  Musculoskeletal: negative for - joint pain or muscle pain  Neurological: negative for - dizziness, headaches, numbness/tingling or seizures  Dermatological: negative for rash and skin lesion changes    PHYSICAL EXAM:  Temp:  [98.7 °F (37.1 °C)] 98.7 °F (37.1 °C)  Heart Rate:  [] 105  Resp:  [16-18] 18  BP: (102-134)/(56-77) 114/63  Physical Exam  General Appearance:    Alert, cooperative, no distress, appears stated age   Head:    Normocephalic, without obvious abnormality, atraumatic   Eyes:    PERRL, conjunctiva/corneas clear, EOM's intact   Ears:    Normal external ear canals, both ears   Nose:   Nares normal, septum midline, mucosa normal, no drainage     or sinus tenderness   Throat:   Lips, mucosa, and tongue normal; teeth and gums normal   Neck:   Supple, symmetrical, trachea midline, no adenopathy;     thyroid:  no enlargement/tenderness/nodules   Back:     Symmetric, no curvature, ROM normal, no CVA tenderness   Lungs:     Clear to auscultation bilaterally, respirations unlabored   Chest Wall:    No tenderness or deformity    Heart:    Regular rate and rhythm, S1 and S2 normal, no murmur, rub    or gallop   Abdomen:     Soft, non-tender, bowel sounds active all four quadrants,     no masses, no organomegaly   Extremities:   Extremities normal, atraumatic, no cyanosis or edema. Well healed scar on left knee. Healing incision on right knee without erythema, exudate, warmth, or tenderness on palpation.   Pulses:   2+ and symmetric all extremities   Skin:   Skin color, texture, turgor normal, no rashes or lesions   Lymph nodes:   Cervical, supraclavicular nodes normal   Neurologic:   CNII-XII grossly intact     DIAGNOSTIC DATA:   Lab Results  (last 24 hours)     Procedure Component Value Units Date/Time    Blood Culture [[id number]] Collected:  02/01/17 2250    Specimen:  Blood from Arm, Right Updated:  02/01/17 2259    BNP [[id number]]  (Normal) Collected:  02/01/17 2231    Specimen:  Blood Updated:  02/01/17 2301     proBNP 182.0 pg/mL     Troponin [[id number]]  (Normal) Collected:  02/01/17 2231    Specimen:  Blood Updated:  02/01/17 2304     Troponin I 0.014 ng/mL     CK-MB [[id number]]  (Normal) Collected:  02/01/17 2231    Specimen:  Blood Updated:  02/01/17 2304     CKMB 0.48 ng/mL     Protime-INR [[id number]]  (Abnormal) Collected:  02/01/17 2231    Specimen:  Blood Updated:  02/01/17 2309     Protime 17.3 (H) Seconds      INR 1.43 (H)     Narrative:       Therapeutic range for most indications is 2.0-3.0 INR,  or 2.5-3.5 for mechanical heart valves.    aPTT [[id number]]  (Abnormal) Collected:  02/01/17 2231    Specimen:  Blood Updated:  02/01/17 2309     PTT 41.1 (H) seconds     Narrative:       The recommended Heparin therapeutic range is 68-97 seconds.    Lactic Acid, Plasma [[id number]]  (Normal) Collected:  02/01/17 2250    Specimen:  Blood Updated:  02/01/17 2316     Lactate 1.3 mmol/L     Blood Culture [[id number]] Collected:  02/02/17 0050    Specimen:  Blood from Arm, Left Updated:  02/02/17 0057           Imaging Results (last 24 hours)     Procedure Component Value Units Date/Time    XR Chest 2 View [[id number]] Collected:  02/02/17 0000     Updated:  02/02/17 0002    Narrative:       Exam: PA lateral chest    Indication: Cough    Comparison: 5/20/2015    Findings: PA lateral chest. Mild thoracic spondylosis. The cardiomediastinal silhouette is unremarkable. Lungs are clear. No pneumothorax or pleural effusion.      Impression:       Impression: No acute cardiopulmonary abnormality.    Electronically signed by:  Rogelio Israel MD  2/2/2017 12:01 AM CST      XR Knee 4+ View Right [[id number]] Collected:  02/02/17 0001     Updated:  02/02/17 0004     Narrative:       Exam: Right knee three views    Indication: Pain    Findings: Three views. Status post total right knee arthroplasty. No acute fracture or evidence of prostatic loosening. No lytic or destructive lesion. A joint effusion is present.      Impression:       Impression: No acute bony abnormality.    Electronically signed by:  Rogelio Israel MD  2/2/2017 12:03 AM CST            I reviewed the patient's new clinical results.    ASSESSMENT AND PLAN: This is a 76 y.o. male with:    Active Hospital Problems (** Indicates Principal Problem)    Diagnosis Date Noted   • **Bronchitis [J40]- patient has received one dose of Rocephin and Azithromycin IV, will continue giving azithromycin 250 mg PO daily  -incentive spirometry, oxygen supplementation as needed, Duo-Nebs  -will order CRP, lactic acid is 1.3 02/02/2017   • Acute renal failure [N17.9]- IVFs,  cc/hour, monitor renal function 02/02/2017   • SAMANTA on CPAP [G47.33]- patient's family will bring in his CPAP 02/02/2017   • Chronic GERD [K21.9]- continue home Protonix 02/02/2017   • Chronic gout [M1A.9XX0]- continue home allopurinol 02/02/2017   • Benign prostatic hyperplasia without lower urinary tract symptoms [N40.0]- continue home Flomax 02/02/2017   • Shortness of breath [R06.02]- O2, Duo-Nebs  -Elevated D-dimer at [facility] of [facility]70, will order V/Q scan due to elevated creatinine (renal failure) 02/02/2017   Patient is S/P right knee surgery- taking Tylenol 500 mg TID for pain management with Flexeril TID as needed for muscle spasms around knee.      Resolved Hospital Problems    Diagnosis Date Noted Date Resolved   No resolved problems to display.       DVT prophylaxis: Xarelto 10 mg daily  Code status is Full Code   [facility] #[id number] , reviewed and scanned into medical record.      I discussed the patients findings and my recommendations with patient.     Dr. Cobian is the attending on record at time of admission, he is aware of the patient's status  and agrees with the above history and physical.        This document has been electronically signed by Henry Charles MD on February 2, 2017 1:45 AM

## 2017-02-03 LAB
ALBUMIN SERPL-MCNC: 3.4 G/DL (ref 3.4–4.8)
ALBUMIN/GLOB SERPL: 1 G/DL (ref 1.1–1.8)
ALP SERPL-CCNC: 90 U/L (ref 38–126)
ALT SERPL W P-5'-P-CCNC: 20 U/L (ref 21–72)
ANION GAP SERPL CALCULATED.3IONS-SCNC: 10 MMOL/L (ref 5–15)
AST SERPL-CCNC: 27 U/L (ref 17–59)
BASOPHILS # BLD AUTO: 0.03 10*3/MM3 (ref 0–0.2)
BASOPHILS NFR BLD AUTO: 0.3 % (ref 0–2)
BILIRUB SERPL-MCNC: 0.7 MG/DL (ref 0.2–1.3)
BUN BLD-MCNC: 21 MG/DL (ref 7–21)
BUN/CREAT SERPL: 18.9 (ref 7–25)
CALCIUM SPEC-SCNC: 8.9 MG/DL (ref 8.4–10.2)
CHLORIDE SERPL-SCNC: 104 MMOL/L (ref 95–110)
CO2 SERPL-SCNC: 25 MMOL/L (ref 22–31)
CREAT BLD-MCNC: 1.11 MG/DL (ref 0.7–1.3)
DEPRECATED RDW RBC AUTO: 49.1 FL (ref 35.1–43.9)
EOSINOPHIL # BLD AUTO: 0.39 10*3/MM3 (ref 0–0.7)
EOSINOPHIL NFR BLD AUTO: 3.5 % (ref 0–7)
ERYTHROCYTE [DISTWIDTH] IN BLOOD BY AUTOMATED COUNT: 15.3 % (ref 11.5–14.5)
GFR SERPL CREATININE-BSD FRML MDRD: 65 ML/MIN/1.73 (ref 60–98)
GLOBULIN UR ELPH-MCNC: 3.4 GM/DL (ref 2.3–3.5)
GLUCOSE BLD-MCNC: 112 MG/DL (ref 60–100)
HCT VFR BLD AUTO: 25.6 % (ref 39–49)
HGB BLD-MCNC: 8.5 G/DL (ref 13.7–17.3)
IMM GRANULOCYTES # BLD: 0.14 10*3/MM3 (ref 0–0.02)
IMM GRANULOCYTES NFR BLD: 1.3 % (ref 0–0.5)
LYMPHOCYTES # BLD AUTO: 1.18 10*3/MM3 (ref 0.6–4.2)
LYMPHOCYTES NFR BLD AUTO: 10.6 % (ref 10–50)
MCH RBC QN AUTO: 28.9 PG (ref 26.5–34)
MCHC RBC AUTO-ENTMCNC: 33.2 G/DL (ref 31.5–36.3)
MCV RBC AUTO: 87.1 FL (ref 80–98)
MONOCYTES # BLD AUTO: 0.81 10*3/MM3 (ref 0–0.9)
MONOCYTES NFR BLD AUTO: 7.3 % (ref 0–12)
NEUTROPHILS # BLD AUTO: 8.54 10*3/MM3 (ref 2–8.6)
NEUTROPHILS NFR BLD AUTO: 77 % (ref 37–80)
PLATELET # BLD AUTO: 428 10*3/MM3 (ref 150–450)
PMV BLD AUTO: 9.5 FL (ref 8–12)
POTASSIUM BLD-SCNC: 3.9 MMOL/L (ref 3.5–5.1)
PROT SERPL-MCNC: 6.8 G/DL (ref 6.3–8.6)
RBC # BLD AUTO: 2.94 10*6/MM3 (ref 4.37–5.74)
SODIUM BLD-SCNC: 139 MMOL/L (ref 137–145)
WBC NRBC COR # BLD: 11.09 10*3/MM3 (ref 3.2–9.8)

## 2017-02-03 PROCEDURE — 94760 N-INVAS EAR/PLS OXIMETRY 1: CPT

## 2017-02-03 PROCEDURE — 85025 COMPLETE CBC W/AUTO DIFF WBC: CPT | Performed by: FAMILY MEDICINE

## 2017-02-03 PROCEDURE — 96361 HYDRATE IV INFUSION ADD-ON: CPT

## 2017-02-03 PROCEDURE — 87205 SMEAR GRAM STAIN: CPT | Performed by: FAMILY MEDICINE

## 2017-02-03 PROCEDURE — 80053 COMPREHEN METABOLIC PANEL: CPT | Performed by: FAMILY MEDICINE

## 2017-02-03 PROCEDURE — 99226 PR SBSQ OBSERVATION CARE/DAY 35 MINUTES: CPT | Performed by: FAMILY MEDICINE

## 2017-02-03 PROCEDURE — 94799 UNLISTED PULMONARY SVC/PX: CPT

## 2017-02-03 PROCEDURE — 87070 CULTURE OTHR SPECIMN AEROBIC: CPT | Performed by: FAMILY MEDICINE

## 2017-02-03 PROCEDURE — 25010000002 CEFTRIAXONE: Performed by: FAMILY MEDICINE

## 2017-02-03 PROCEDURE — G0378 HOSPITAL OBSERVATION PER HR: HCPCS

## 2017-02-03 PROCEDURE — 94640 AIRWAY INHALATION TREATMENT: CPT

## 2017-02-03 PROCEDURE — 96366 THER/PROPH/DIAG IV INF ADDON: CPT

## 2017-02-03 RX ORDER — INDOMETHACIN 25 MG/1
75 CAPSULE ORAL 2 TIMES DAILY WITH MEALS
Status: DISCONTINUED | OUTPATIENT
Start: 2017-02-03 | End: 2017-02-04 | Stop reason: HOSPADM

## 2017-02-03 RX ADMIN — IPRATROPIUM BROMIDE AND ALBUTEROL SULFATE 3 ML: 2.5; .5 SOLUTION RESPIRATORY (INHALATION) at 08:55

## 2017-02-03 RX ADMIN — CEFTRIAXONE 1 G: 1 INJECTION, POWDER, FOR SOLUTION INTRAMUSCULAR; INTRAVENOUS at 06:12

## 2017-02-03 RX ADMIN — ACETAMINOPHEN 500 MG: 500 TABLET ORAL at 20:57

## 2017-02-03 RX ADMIN — ACETAMINOPHEN 500 MG: 500 TABLET ORAL at 17:22

## 2017-02-03 RX ADMIN — SODIUM CHLORIDE 100 ML/HR: 900 INJECTION, SOLUTION INTRAVENOUS at 22:48

## 2017-02-03 RX ADMIN — INDOMETHACIN 75 MG: 25 CAPSULE ORAL at 14:50

## 2017-02-03 RX ADMIN — IPRATROPIUM BROMIDE AND ALBUTEROL SULFATE 3 ML: 2.5; .5 SOLUTION RESPIRATORY (INHALATION) at 20:42

## 2017-02-03 RX ADMIN — ALLOPURINOL 300 MG: 300 TABLET ORAL at 08:25

## 2017-02-03 RX ADMIN — ACETAMINOPHEN 500 MG: 500 TABLET ORAL at 08:25

## 2017-02-03 RX ADMIN — VALSARTAN 80 MG: 80 TABLET, FILM COATED ORAL at 08:25

## 2017-02-03 RX ADMIN — AZITHROMYCIN 250 MG: 250 TABLET, FILM COATED ORAL at 00:19

## 2017-02-03 RX ADMIN — PANTOPRAZOLE SODIUM 40 MG: 40 TABLET, DELAYED RELEASE ORAL at 08:25

## 2017-02-03 RX ADMIN — SPIRONOLACTONE 25 MG: 25 TABLET ORAL at 08:25

## 2017-02-03 RX ADMIN — IPRATROPIUM BROMIDE AND ALBUTEROL SULFATE 3 ML: 2.5; .5 SOLUTION RESPIRATORY (INHALATION) at 12:31

## 2017-02-03 RX ADMIN — RIVAROXABAN 10 MG: 10 TABLET, FILM COATED ORAL at 17:22

## 2017-02-03 RX ADMIN — SODIUM CHLORIDE 100 ML/HR: 900 INJECTION, SOLUTION INTRAVENOUS at 00:19

## 2017-02-03 RX ADMIN — TAMSULOSIN HYDROCHLORIDE 0.4 MG: 0.4 CAPSULE ORAL at 08:25

## 2017-02-03 RX ADMIN — HYDROCHLOROTHIAZIDE 12.5 MG: 12.5 CAPSULE ORAL at 08:25

## 2017-02-03 RX ADMIN — IPRATROPIUM BROMIDE AND ALBUTEROL SULFATE 3 ML: 2.5; .5 SOLUTION RESPIRATORY (INHALATION) at 17:04

## 2017-02-03 RX ADMIN — SODIUM CHLORIDE 100 ML/HR: 900 INJECTION, SOLUTION INTRAVENOUS at 17:48

## 2017-02-03 NOTE — PLAN OF CARE
Problem: Patient Care Overview (Adult)  Goal: Plan of Care Review  Outcome: Ongoing (interventions implemented as appropriate)    02/02/17 1633   Coping/Psychosocial Response Interventions   Plan Of Care Reviewed With patient   Patient Care Overview   Progress no change   Outcome Evaluation   Outcome Summary/Follow up Plan Vitals stable, no new concerns, pain controlled.        Goal: Adult Individualization and Mutuality  Outcome: Ongoing (interventions implemented as appropriate)    02/03/17 0450   Individualization   Patient Specific Preferences Patient likes two bags of ice to put on his old knee incisions       Goal: Discharge Needs Assessment  Outcome: Ongoing (interventions implemented as appropriate)    02/02/17 1252   Discharge Needs Assessment   Concerns To Be Addressed no discharge needs identified   Readmission Within The Last 30 Days current reason for admission unrelated to previous admission         Problem: COPD, Chronic Bronchitis/Emphysema (Adult)  Goal: Signs and Symptoms of Listed Potential Problems Will be Absent or Manageable (COPD, Chronic Bronchitis/Emphysema)  Outcome: Ongoing (interventions implemented as appropriate)    02/03/17 1557   COPD, Chronic Bronchitis/Emphysema   Problems Assessed (COPD, Chronic Bronchitis/Emphysema) all   Problems Present (COPD, Chronic Bronchitis/Emphysema) none         Problem: Pressure Ulcer Risk (Kendall Scale) (Adult,Obstetrics,Pediatric)  Goal: Identify Related Risk Factors and Signs and Symptoms  Outcome: Ongoing (interventions implemented as appropriate)    02/03/17 1557   Pressure Ulcer Risk (Kendall Scale)   Related Risk Factors (Pressure Ulcer Risk (Kendall Scale)) mobility impaired       Goal: Skin Integrity  Outcome: Ongoing (interventions implemented as appropriate)    02/03/17 1557   Pressure Ulcer Risk (Kendall Scale) (Adult,Obstetrics,Pediatric)   Skin Integrity making progress toward outcome

## 2017-02-03 NOTE — PLAN OF CARE
Problem: Patient Care Overview (Adult)  Goal: Plan of Care Review  Outcome: Ongoing (interventions implemented as appropriate)  Sent off new sputum specimen    02/02/17 9426   Coping/Psychosocial Response Interventions   Plan Of Care Reviewed With patient   Patient Care Overview   Progress no change   Outcome Evaluation   Outcome Summary/Follow up Plan Vitals stable, no new concerns, pain controlled.        Goal: Adult Individualization and Mutuality  Outcome: Ongoing (interventions implemented as appropriate)    Problem: COPD, Chronic Bronchitis/Emphysema (Adult)  Goal: Signs and Symptoms of Listed Potential Problems Will be Absent or Manageable (COPD, Chronic Bronchitis/Emphysema)  Outcome: Ongoing (interventions implemented as appropriate)    Problem: Pressure Ulcer Risk (Kendall Scale) (Adult,Obstetrics,Pediatric)  Goal: Identify Related Risk Factors and Signs and Symptoms  Outcome: Ongoing (interventions implemented as appropriate)  Goal: Skin Integrity  Outcome: Ongoing (interventions implemented as appropriate)

## 2017-02-03 NOTE — PROGRESS NOTES
[address] [address]  T - [id number]         PROGRESS NOTE         SUBJECTIVE:   Patient Care Team:  Felicitas Brush DO as PCP - General    Chief Complaint:     Chief Complaint   Patient presents with   • Cough       Subjective     Patient is 76 y.o. male presents with dyspnea. He is having some pain in the right great toe..      ROS/HISTORY/ CURRENT MEDICATIONS/OBJECTIVE/VS/PE:   Review of Systems:   Review of Systems   Constitutional: Negative for fatigue and fever.   Respiratory: Positive for cough and shortness of breath. Negative for chest tightness and wheezing.    Cardiovascular: Negative for chest pain, palpitations and leg swelling.       History:     Past Medical History   Diagnosis Date   • Achilles tendinitis    • Acute atopic conjunctivitis    • Astigmatism      refractive change     • Atopic conjunctivitis    • Borderline glaucoma    • Calcaneal spur    • Cardiomyopathy    • Carpal tunnel syndrome    • Conjunctivitis      allergic   • Coronary arteriosclerosis    • Cortical senile cataract      mild   • Degenerative joint disease      involving multiple joints    • Disease of gallbladder       s/p open jose for gangrenous cholecystitis   • Diverticular disease of colon       s/p hartmans and now take down colostomy      • Essential hypertension    • Foot pain      history of stress fracture right foot      • GERD (gastroesophageal reflux disease)    • Gouty arthropathy    • Hearing loss      wears hearing aids      • Heel pain      posterior   • History of colonic polyps    • History of echocardiogram 09/03/2014     Echocardiogram W/ color flow 46065 (Mild CLVH. Decreased LV systolic function with EF of 35%. Mitral and AV mildly thickened.Aortic sclerosis.Tricuspid intact.)   • History of echocardiogram 02/26/2010     Echocadiogram W/O color flow 05103 (Global hypokinesis with EF 35%. Left atrium appears to be dilated. Aortic root normal. Early  diastolic dysfunctin. Mild mitral & tricuspid regurgitation. Mild pulmonic egurgitation. RV pressure approx. 30-35 mmHg.)   • Hyperlipidemia    • Incisional hernia      s/p repair with mesh    • Keratoconjunctivitis sicca    • Myopia    • Onychogryposis    • Onycholysis    • Onychomycosis    • Pain in toe    • Plantar fasciitis      chronic   • Presbyopia    • Sleep apnea    • Vitreous detachment      history of posterior right eye      • Vitreous floaters      history of right eye     Past Surgical History   Procedure Laterality Date   • Cardiac catheterization  05/22/2015     Cardiac cath 21503 (Negative FFR evaluation with a value 0.90 which was not hemdynamically significant with symptoms of chest discomfort.)   • Cardiac catheterization  07/09/2010     Cardiac cath 28721 (Small caliber 3rd obtuse marginal branch which was subtotally occluded and filling in through left-to-left collateral. Proximal LAD with 30% stenosis. lst diagonal branch with 40-50% stenosis with evidence of good BOBY 3 flow. Diffusely diseased RCA)   • Carpal tunnel release  11/21/2003     Carpal tunnel surgery (Bilateral carpal tunnel release, endoscopic.)   • Cholecystectomy     • Colonoscopy  12/02/2015     Colonoscopy remove polyps 34474 (A single polyp was found in the ascending and the transverse colon;removed by hot biopsy polypectomy.)   • Colonoscopy  12/03/2014     Colonoscopy remove polyps 81605 (A single polyp was found in the cecum. Removed by hot biopsy polypectomy. Single polyp in the transverse colon.Removed by hot biopsy polypectomy.Diverticulosis found in the desc.colon.Rectal stump and distal sig.)   • Colostomy  12/04/2014     Descending colostomy takedown with primary anastomosis.   • Cystoscopy  12/04/2014     Cystoscopy (With bilateral retrogrades. Right double J stent placement. Ureteroscopy on left.)   • Other surgical history  01/22/2014   • Other surgical history       DEBRIDE NAIL 6 OR MORE 55790 (Onychogryposis,  Pain in toe, Onychomycosis) (6): 04/24/2015, 01/25/2015, 10/21/2014, 04/22/2014, 01/22/2014   • Other surgical history  04/29/2014     EXTENDED VISUAL FIELDS STUDY 86621 (Borderline glaucoma)    • Knee arthroscopy  07/17/1997     Knee arthroscopy, surgery (Medial meniscectomy. Arthritis, medial compartment; torn medial meniscus.)   • Exploratory laparotomy  09/03/2014     sigmoid colectomy with descending colostomy or Fitz's procedure. Incidental appendectomy.   • Other surgical history       OCT DISC NFL 38086 (Borderline glaucoma) (2): 05/06/2015, 04/24/2013   • Joint replacement Left 11/18/2016   • Joint replacement Right Jannuary 16, 2017     Knee     Family History   Problem Relation Age of Onset   • Cancer Other    • Diabetes Other    • Heart disease Other    • Hypertension Other    • Thyroid disease Other      Social History   Substance Use Topics   • Smoking status: Former Smoker   • Smokeless tobacco: None   • Alcohol use No     Prescriptions Prior to Admission   Medication Sig Dispense Refill Last Dose   • acetaminophen (TYLENOL) 500 MG tablet Take 1,000 mg by mouth Every 6 (Six) Hours As Needed for mild pain (1-3).   2/1/2017 at Unknown time   • allopurinol (ZYLOPRIM) 300 MG tablet Take 300 mg by mouth Daily.   2/1/2017 at Unknown time   • carvedilol (COREG) 3.125 MG tablet Take 3.125 mg by mouth 2 (Two) Times a Day With Meals.   2/1/2017 at Unknown time   • indomethacin SR (INDOCIN SR) 75 MG CR capsule Take 75 mg by mouth Daily.   Past Week at Unknown time   • pantoprazole (PROTONIX) 40 MG EC tablet Take 40 mg by mouth Daily.   2/1/2017 at Unknown time   • rivaroxaban (XARELTO) 10 MG tablet Take 10 mg by mouth Daily.   2/1/2017 at Unknown time   • spironolactone (ALDACTONE) 25 MG tablet Take 25 mg by mouth Daily.   Past Week at Unknown time   • tamsulosin (FLOMAX) 0.4 MG capsule 24 hr capsule Take 1 capsule by mouth Daily.   2/1/2017 at Unknown time   • valsartan-hydrochlorothiazide (DIOVAN-HCT)  80-12.5 MG per tablet Take 1 tablet by mouth Daily.   2/1/2017 at Unknown time   • aspirin 81 MG chewable tablet Chew 81 mg Daily.   More than a month at Unknown time     Allergies:  Lortab [hydrocodone-acetaminophen] and Tape    Current Medications:     Current Facility-Administered Medications   Medication Dose Route Frequency Provider Last Rate Last Dose   • acetaminophen (TYLENOL) tablet 500 mg  500 mg Oral TID Henry Montes MD   500 mg at 02/03/17 0825   • acetaminophen (TYLENOL) tablet 650 mg  650 mg Oral Q4H PRN Henry Montes MD   650 mg at 02/02/17 1440   • allopurinol (ZYLOPRIM) tablet 300 mg  300 mg Oral Daily Henry Montes MD   300 mg at 02/03/17 0825   • azithromycin (ZITHROMAX) tablet 250 mg  250 mg Oral Q24H Henry Montes MD   250 mg at 02/03/17 0019   • bisacodyl (DULCOLAX) EC tablet 5 mg  5 mg Oral Daily PRN Henry Montes MD       • bisacodyl (DULCOLAX) suppository 10 mg  10 mg Rectal Daily PRN Henry Montes MD       • cefTRIAXone (ROCEPHIN) 1 g/100 mL 0.9% NS (MBP)  1 g Intravenous Q24H Halima Pink MD   Stopped at 02/03/17 0712   • cyclobenzaprine (FLEXERIL) tablet 5 mg  5 mg Oral TID PRN Henry Montes MD   5 mg at 02/02/17 2151   • guaiFENesin (ROBITUSSIN) 100 MG/5ML oral solution 200 mg  200 mg Oral Q4H PRN Henry Montes MD   200 mg at 02/02/17 0309   • hydrochlorothiazide (MICROZIDE) capsule 12.5 mg  12.5 mg Oral Q24H Henry Montes MD   12.5 mg at 02/03/17 0825   • indomethacin (INDOCIN) capsule 75 mg  75 mg Oral BID With Meals Halima Pink MD       • ipratropium-albuterol (DUO-NEB) nebulizer solution 3 mL  3 mL Nebulization 4x Daily - RT Jamie Morrison MD   3 mL at 02/03/17 1231   • ondansetron (ZOFRAN) injection 4 mg  4 mg Intravenous Q6H PRN Henry Montes MD       • pantoprazole (PROTONIX) EC tablet 40 mg  40 mg Oral Daily Henry Montes MD   40 mg at 02/03/17 0825   • rivaroxaban (XARELTO) tablet 10 mg  10 mg Oral Once Jamie Morrison MD   10 mg at 02/02/17  0101   • rivaroxaban (XARELTO) tablet 10 mg  10 mg Oral Daily Henry Montes MD   10 mg at 02/02/17 0916   • sodium chloride 0.9 % flush 1-10 mL  1-10 mL Intravenous PRN Henry Montes MD       • sodium chloride 0.9 % infusion  100 mL/hr Intravenous Continuous Henry Montes  mL/hr at 02/03/17 0712 100 mL/hr at 02/03/17 0712   • spironolactone (ALDACTONE) tablet 25 mg  25 mg Oral Daily Henry Montes MD   25 mg at 02/03/17 0825   • tamsulosin (FLOMAX) 24 hr capsule 0.4 mg  0.4 mg Oral Daily Henry Montes MD   0.4 mg at 02/03/17 0825   • valsartan (DIOVAN) tablet 80 mg  80 mg Oral Q24H Henry Montes MD   80 mg at 02/03/17 0825       Objective     Physical Exam:   Temp:  [97.4 °F (36.3 °C)-99 °F (37.2 °C)] 98.4 °F (36.9 °C)  Heart Rate:  [100-122] 109  Resp:  [18-20] 20  BP: (111-130)/(53-59) 117/56    Physical Exam   Constitutional: He appears well-developed and well-nourished.   Neck: Normal range of motion.   Cardiovascular: Normal rate, regular rhythm and normal heart sounds.  Exam reveals no gallop and no friction rub.    No murmur heard.  Pulmonary/Chest: Effort normal and breath sounds normal. No respiratory distress. He has no wheezes. He has no rales.   Abdominal: Soft. Bowel sounds are normal. He exhibits no distension. There is no tenderness.   Skin: Skin is warm.   Nursing note and vitals reviewed.           Results Review:     WBC WBC   Date Value Ref Range Status   02/03/2017 11.09 (H) 3.20 - 9.80 10*3/mm3 Final   02/02/2017 14.50 (H) 3.20 - 9.80 10*3/mm3 Final      HGB HEMOGLOBIN   Date Value Ref Range Status   02/03/2017 8.5 (L) 13.7 - 17.3 g/dL Final   02/02/2017 8.9 (L) 13.7 - 17.3 g/dL Final      HCT HEMATOCRIT   Date Value Ref Range Status   02/03/2017 25.6 (L) 39.0 - 49.0 % Final   02/02/2017 26.3 (L) 39.0 - 49.0 % Final      Platlets PLATELETS   Date Value Ref Range Status   02/03/2017 428 150 - 450 10*3/mm3 Final   02/02/2017 398 150 - 450 10*3/mm3 Final          Imaging Results (last 24  hours)     ** No results found for the last 24 hours. **           I reviewed the patient's new clinical results.  I reviewed the patient's new imaging results and agree with the interpretation.     ASSESSMENT/PLAN:   Assessment/Plan   Principal Problem:    Sepsis due to pneumonia  Active Problems:    Bronchitis    Acute renal failure    SAMANTA on CPAP    Chronic GERD    Chronic gout    Benign prostatic hyperplasia without lower urinary tract symptoms    Shortness of breath    Elevated d-dimer    S/P knee surgery      Continue with current medications.  I agree with the assessment and plan as discussed with the resident.    I discussed the patients findings and my recommendations with patient.      Joesph Bower MD  02/03/17  2:45 PM

## 2017-02-03 NOTE — PROGRESS NOTES
FAMILY MEDICINE DAILY PROGRESS NOTE  NAME: Raleigh Sanz  : 1940  MRN: [id number]     LOS: 0 days     PROVIDER OF SERVICE: Halima Pink MD    Chief Complaint: Sepsis due to pneumonia    Subjective:     Interval History:  History taken from: patient  Patient Complaints: of increased urinary frequency, still having cough, and some redness on his toe due to a recurrent gouty flare. Otherwise feeling well.      Review of Systems:   Review of Systems   Constitutional: Negative.    HENT: Positive for congestion and postnasal drip.    Eyes: Negative.    Respiratory: Positive for cough. Negative for shortness of breath.    Endocrine: Negative.    Genitourinary: Positive for frequency. Negative for dysuria.   Musculoskeletal: Positive for joint swelling.   Skin: Negative.    Allergic/Immunologic: Negative.    Neurological: Negative.    Hematological: Negative.    Psychiatric/Behavioral: Negative.        Objective:     Vital Signs  Temp:  [97.4 °F (36.3 °C)-99 °F (37.2 °C)] 98.5 °F (36.9 °C)  Heart Rate:  [101-112] 111  Resp:  [18-20] 18  BP: (111-130)/(53-59) 130/59    Physical Exam  Physical Exam   Constitutional: He is oriented to person, place, and time. He appears well-developed and well-nourished.   HENT:   Head: Normocephalic and atraumatic.   Eyes: Conjunctivae, EOM and lids are normal. Pupils are equal, round, and reactive to light.   Neck: Normal range of motion. Neck supple.   Cardiovascular: Normal rate, regular rhythm and normal heart sounds.    Pulmonary/Chest: Effort normal. No respiratory distress. He has rales.   Abdominal: Soft. Normal appearance and bowel sounds are normal. There is no tenderness.   Musculoskeletal: Normal range of motion.        Neurological: He is alert and oriented to person, place, and time.   Skin: Skin is warm, dry and intact.   Psychiatric: He has a normal mood and affect. His speech is normal and behavior is normal. Judgment and thought content normal.  Cognition and memory are normal.       Medication Review    Current Facility-Administered Medications:   •  acetaminophen (TYLENOL) tablet 500 mg, 500 mg, Oral, TID, Henry Montes MD, 500 mg at 02/02/17 2151  •  acetaminophen (TYLENOL) tablet 650 mg, 650 mg, Oral, Q4H PRN, Henry Montes MD, 650 mg at 02/02/17 1440  •  allopurinol (ZYLOPRIM) tablet 300 mg, 300 mg, Oral, Daily, Henry Montes MD, 300 mg at 02/02/17 0915  •  azithromycin (ZITHROMAX) tablet 250 mg, 250 mg, Oral, Q24H, Henry Montes MD, 250 mg at 02/03/17 0019  •  bisacodyl (DULCOLAX) EC tablet 5 mg, 5 mg, Oral, Daily PRN, Henry Montes MD  •  bisacodyl (DULCOLAX) suppository 10 mg, 10 mg, Rectal, Daily PRN, Henry Montes MD  •  cefTRIAXone (ROCEPHIN) 1 g/100 mL 0.9% NS (MBP), 1 g, Intravenous, Q24H, Halima Pink MD, 1 g at 02/03/17 0612  •  cyclobenzaprine (FLEXERIL) tablet 5 mg, 5 mg, Oral, TID PRN, Henry Montes MD, 5 mg at 02/02/17 2151  •  guaiFENesin (ROBITUSSIN) 100 MG/5ML oral solution 200 mg, 200 mg, Oral, Q4H PRN, Henry Montes MD, 200 mg at 02/02/17 0309  •  hydrochlorothiazide (MICROZIDE) capsule 12.5 mg, 12.5 mg, Oral, Q24H, Henry Montes MD, 12.5 mg at 02/02/17 0915  •  ipratropium-albuterol (DUO-NEB) nebulizer solution 3 mL, 3 mL, Nebulization, 4x Daily - RT, Jamie Morrison MD, 3 mL at 02/02/17 2113  •  ondansetron (ZOFRAN) injection 4 mg, 4 mg, Intravenous, Q6H PRN, Henry Montes MD  •  pantoprazole (PROTONIX) EC tablet 40 mg, 40 mg, Oral, Daily, Henry Montes MD, 40 mg at 02/02/17 0915  •  rivaroxaban (XARELTO) tablet 10 mg, 10 mg, Oral, Once, Jamie Morrison MD, 10 mg at 02/02/17 0101  •  rivaroxaban (XARELTO) tablet 10 mg, 10 mg, Oral, Daily, Henry Montes MD, 10 mg at 02/02/17 0916  •  sodium chloride 0.9 % flush 1-10 mL, 1-10 mL, Intravenous, PRN, Henry Montes MD  •  sodium chloride 0.9 % infusion, 100 mL/hr, Intravenous, Continuous, Henry Montes MD, Last Rate: 100 mL/hr at 02/03/17 0019, 100 mL/hr at 02/03/17  0019  •  spironolactone (ALDACTONE) tablet 25 mg, 25 mg, Oral, Daily, Henry Montes MD, 25 mg at 02/02/17 0916  •  tamsulosin (FLOMAX) 24 hr capsule 0.4 mg, 0.4 mg, Oral, Daily, Henry Montes MD, 0.4 mg at 02/02/17 0915  •  valsartan (DIOVAN) tablet 80 mg, 80 mg, Oral, Q24H, Henry Montes MD, 80 mg at 02/02/17 0916     Diagnostic Data    Lab Results (last 24 hours)     Procedure Component Value Units Date/Time    Comprehensive Metabolic Panel [[id number]]  (Abnormal) Collected:  02/02/17 0623    Specimen:  Blood Updated:  02/02/17 0720     Glucose 112 (H) mg/dL      BUN 38 (H) mg/dL      Creatinine 1.55 (H) mg/dL      Sodium 138 mmol/L      Potassium 4.4 mmol/L      Chloride 101 mmol/L      CO2 26.0 mmol/L      Calcium 8.9 mg/dL      Total Protein 6.9 g/dL      Albumin 3.40 g/dL      ALT (SGPT) 24 U/L      AST (SGOT) 28 U/L      Alkaline Phosphatase 90 U/L      Total Bilirubin 0.8 mg/dL      eGFR Non African Amer 44 mL/min/1.73      Globulin 3.5 gm/dL      A/G Ratio 1.0 (L) g/dL      BUN/Creatinine Ratio 24.5      Anion Gap 11.0 mmol/L     Narrative:       The MDRD GFR formula is only valid for adults with stable renal function between ages 18 and 70.    C-reactive Protein [[id number]]  (Abnormal) Collected:  02/02/17 0623    Specimen:  Blood Updated:  02/02/17 0736     C-Reactive Protein 25.30 (H) mg/dL     CBC Auto Differential [[id number]]  (Abnormal) Collected:  02/02/17 0858    Specimen:  Blood Updated:  02/02/17 0907     WBC 14.50 (H) 10*3/mm3      RBC 3.04 (L) 10*6/mm3      Hemoglobin 8.9 (L) g/dL      Hematocrit 26.3 (L) %      MCV 86.5 fL      MCH 29.3 pg      MCHC 33.8 g/dL      RDW 15.4 (H) %      RDW-SD 48.4 (H) fl      MPV 8.9 fL      Platelets 398 10*3/mm3     CBC & Differential [[id number]] Collected:  02/02/17 0858    Specimen:  Blood Updated:  02/02/17 7189    Narrative:       The following orders were created for panel order CBC & Differential.  Procedure                               Abnormality          Status                     ---------                               -----------         ------                     Manual Differential[[id number]]           Abnormal            Final result               CBC Auto Differential[[id number]]         Abnormal            Final result                 Please view results for these tests on the individual orders.    Manual Differential [[id number]]  (Abnormal) Collected:  02/02/17 0858    Specimen:  Blood Updated:  02/02/17 1259     Neutrophil % 80.0 %      Lymphocyte % 11.0 %      Monocyte % 4.0 %      Basophil % 1.0 %      Bands %  1.0 %      Myelocyte % 1.0 (H) %      Atypical Lymphocyte % 2.0 %      Neutrophils Absolute 11.75 (H) 10*3/mm3      Lymphocytes Absolute 1.60 10*3/mm3      Monocytes Absolute 0.58 10*3/mm3      Basophils Absolute 0.15 10*3/mm3      Anisocytosis Slight/1+      Hypochromia Slight/1+      WBC Morphology Normal      Platelet Estimate Adequate     Respiratory Culture [[id number]] Collected:  02/02/17 1358    Specimen:  Sputum from Cough Updated:  02/02/17 1525     Respiratory Culture Rejected      Gram Stain Result Moderate (3+) WBCs per low power field              Moderate (3+) Epithelial cells per low power field      Mixed bacterial gautam              Specimen rejected based upon microscopic exam of gram stain    Blood Culture [[id number]]  (Normal) Collected:  02/01/17 2250    Specimen:  Blood from Arm, Right Updated:  02/02/17 2301     Blood Culture No growth at 24 hours     Blood Culture [[id number]]  (Normal) Collected:  02/02/17 0050    Specimen:  Blood from Arm, Left Updated:  02/03/17 0104     Blood Culture No growth at 24 hours             I reviewed the patient's new clinical results.    Assessment/Plan:   Raleigh Sanz is a 76 y.o. male who presents with complaint of weakness, fatigue, and productive cough, s/p bilateral knee repair in [facility]. His D-dimer was elevated to 3970 at [facility] but patient has been on Xarelto,  andV/Q scan  showed low probability of PE. Patient has productive cough with rhonchi, admitted for sepsis secondary to PNA, most likely healthcare associated, treating with rocephin and azithromycin. His blood pressure was low and his cardiologist had him stop his coreg, will restart once patients blood pressure improves. Hypotension part of SIRS criteria. Complained about redness at right second toe interphalangeal joint erythema and swelling, will give his gout medications for flare.      Active Hospital Problems (** Indicates Principal Problem)    Diagnosis Date Noted   • **Sepsis due to pneumonia [J18.9, A41.9] 02/02/2017   • Bronchitis [J40] 02/02/2017   • Acute renal failure [N17.9] 02/02/2017   • SAMANTA on CPAP [G47.33] 02/02/2017   • Chronic GERD [K21.9] 02/02/2017   • Chronic gout [M1A.9XX0] 02/02/2017   • Benign prostatic hyperplasia without lower urinary tract symptoms [N40.0] 02/02/2017   • Shortness of breath [R06.02] 02/02/2017   • Elevated d-dimer [R79.1] 02/02/2017   • S/P knee surgery [Z98.890] 02/02/2017      Resolved Hospital Problems    Diagnosis Date Noted Date Resolved   No resolved problems to display.         DVT prophylaxis: Xarelto  Code status is Full Code    Plan for disposition:Where: home and When:  tomorrow      Time: 30 mins           This document has been electronically signed by Halima Pink MD on February 3, 2017 6:31 AM

## 2017-02-04 VITALS
DIASTOLIC BLOOD PRESSURE: 66 MMHG | HEIGHT: 71 IN | TEMPERATURE: 98 F | BODY MASS INDEX: 30.74 KG/M2 | RESPIRATION RATE: 18 BRPM | OXYGEN SATURATION: 95 % | SYSTOLIC BLOOD PRESSURE: 120 MMHG | HEART RATE: 95 BPM | WEIGHT: 219.6 LBS

## 2017-02-04 PROBLEM — N17.9 ACUTE RENAL FAILURE (HCC): Status: RESOLVED | Noted: 2017-02-02 | Resolved: 2017-02-04

## 2017-02-04 PROBLEM — R79.89 ELEVATED D-DIMER: Status: RESOLVED | Noted: 2017-02-02 | Resolved: 2017-02-04

## 2017-02-04 PROBLEM — R06.02 SHORTNESS OF BREATH: Status: RESOLVED | Noted: 2017-02-02 | Resolved: 2017-02-04

## 2017-02-04 LAB
ALBUMIN SERPL-MCNC: 3.4 G/DL (ref 3.4–4.8)
ALBUMIN/GLOB SERPL: 1.1 G/DL (ref 1.1–1.8)
ALP SERPL-CCNC: 82 U/L (ref 38–126)
ALT SERPL W P-5'-P-CCNC: 19 U/L (ref 21–72)
ANION GAP SERPL CALCULATED.3IONS-SCNC: 10 MMOL/L (ref 5–15)
AST SERPL-CCNC: 20 U/L (ref 17–59)
BASOPHILS # BLD AUTO: 0.03 10*3/MM3 (ref 0–0.2)
BASOPHILS NFR BLD AUTO: 0.3 % (ref 0–2)
BILIRUB SERPL-MCNC: 0.6 MG/DL (ref 0.2–1.3)
BUN BLD-MCNC: 12 MG/DL (ref 7–21)
BUN/CREAT SERPL: 12.2 (ref 7–25)
CALCIUM SPEC-SCNC: 8.5 MG/DL (ref 8.4–10.2)
CHLORIDE SERPL-SCNC: 105 MMOL/L (ref 95–110)
CO2 SERPL-SCNC: 25 MMOL/L (ref 22–31)
CREAT BLD-MCNC: 0.98 MG/DL (ref 0.7–1.3)
CRP SERPL-MCNC: 22 MG/DL (ref 0–1)
DEPRECATED RDW RBC AUTO: 49.1 FL (ref 35.1–43.9)
EOSINOPHIL # BLD AUTO: 0.4 10*3/MM3 (ref 0–0.7)
EOSINOPHIL NFR BLD AUTO: 4.2 % (ref 0–7)
ERYTHROCYTE [DISTWIDTH] IN BLOOD BY AUTOMATED COUNT: 15.3 % (ref 11.5–14.5)
GFR SERPL CREATININE-BSD FRML MDRD: 75 ML/MIN/1.73 (ref 60–98)
GLOBULIN UR ELPH-MCNC: 3.1 GM/DL (ref 2.3–3.5)
GLUCOSE BLD-MCNC: 117 MG/DL (ref 60–100)
HCT VFR BLD AUTO: 25 % (ref 39–49)
HGB BLD-MCNC: 8.2 G/DL (ref 13.7–17.3)
IMM GRANULOCYTES # BLD: 0.11 10*3/MM3 (ref 0–0.02)
IMM GRANULOCYTES NFR BLD: 1.2 % (ref 0–0.5)
LYMPHOCYTES # BLD AUTO: 0.88 10*3/MM3 (ref 0.6–4.2)
LYMPHOCYTES NFR BLD AUTO: 9.2 % (ref 10–50)
MCH RBC QN AUTO: 28.7 PG (ref 26.5–34)
MCHC RBC AUTO-ENTMCNC: 32.8 G/DL (ref 31.5–36.3)
MCV RBC AUTO: 87.4 FL (ref 80–98)
MONOCYTES # BLD AUTO: 0.8 10*3/MM3 (ref 0–0.9)
MONOCYTES NFR BLD AUTO: 8.4 % (ref 0–12)
NEUTROPHILS # BLD AUTO: 7.3 10*3/MM3 (ref 2–8.6)
NEUTROPHILS NFR BLD AUTO: 76.7 % (ref 37–80)
PLATELET # BLD AUTO: 423 10*3/MM3 (ref 150–450)
PMV BLD AUTO: 9.7 FL (ref 8–12)
POTASSIUM BLD-SCNC: 4.2 MMOL/L (ref 3.5–5.1)
PROT SERPL-MCNC: 6.5 G/DL (ref 6.3–8.6)
RBC # BLD AUTO: 2.86 10*6/MM3 (ref 4.37–5.74)
SODIUM BLD-SCNC: 140 MMOL/L (ref 137–145)
WBC NRBC COR # BLD: 9.52 10*3/MM3 (ref 3.2–9.8)

## 2017-02-04 PROCEDURE — G0378 HOSPITAL OBSERVATION PER HR: HCPCS

## 2017-02-04 PROCEDURE — 99225 PR SBSQ OBSERVATION CARE/DAY 25 MINUTES: CPT | Performed by: FAMILY MEDICINE

## 2017-02-04 PROCEDURE — 25010000002 CEFTRIAXONE: Performed by: FAMILY MEDICINE

## 2017-02-04 PROCEDURE — 94640 AIRWAY INHALATION TREATMENT: CPT

## 2017-02-04 PROCEDURE — 86140 C-REACTIVE PROTEIN: CPT | Performed by: FAMILY MEDICINE

## 2017-02-04 PROCEDURE — 85025 COMPLETE CBC W/AUTO DIFF WBC: CPT | Performed by: FAMILY MEDICINE

## 2017-02-04 PROCEDURE — 80053 COMPREHEN METABOLIC PANEL: CPT | Performed by: FAMILY MEDICINE

## 2017-02-04 RX ORDER — AZITHROMYCIN 250 MG/1
250 TABLET, FILM COATED ORAL DAILY
Qty: 4 TABLET | Refills: 0 | Status: SHIPPED | OUTPATIENT
Start: 2017-02-04 | End: 2017-06-16

## 2017-02-04 RX ORDER — CEFDINIR 300 MG/1
300 CAPSULE ORAL 2 TIMES DAILY
Qty: 8 CAPSULE | Refills: 0 | Status: SHIPPED | OUTPATIENT
Start: 2017-02-04 | End: 2017-06-16

## 2017-02-04 RX ADMIN — INDOMETHACIN 75 MG: 25 CAPSULE ORAL at 08:39

## 2017-02-04 RX ADMIN — HYDROCHLOROTHIAZIDE 12.5 MG: 12.5 CAPSULE ORAL at 08:39

## 2017-02-04 RX ADMIN — ACETAMINOPHEN 500 MG: 500 TABLET ORAL at 08:39

## 2017-02-04 RX ADMIN — CEFTRIAXONE 1 G: 1 INJECTION, POWDER, FOR SOLUTION INTRAMUSCULAR; INTRAVENOUS at 06:05

## 2017-02-04 RX ADMIN — ACETAMINOPHEN 650 MG: 325 TABLET ORAL at 03:50

## 2017-02-04 RX ADMIN — TAMSULOSIN HYDROCHLORIDE 0.4 MG: 0.4 CAPSULE ORAL at 08:39

## 2017-02-04 RX ADMIN — IPRATROPIUM BROMIDE AND ALBUTEROL SULFATE 3 ML: 2.5; .5 SOLUTION RESPIRATORY (INHALATION) at 07:24

## 2017-02-04 RX ADMIN — SPIRONOLACTONE 25 MG: 25 TABLET ORAL at 08:39

## 2017-02-04 RX ADMIN — VALSARTAN 80 MG: 80 TABLET, FILM COATED ORAL at 08:39

## 2017-02-04 RX ADMIN — ALLOPURINOL 300 MG: 300 TABLET ORAL at 08:39

## 2017-02-04 RX ADMIN — PANTOPRAZOLE SODIUM 40 MG: 40 TABLET, DELAYED RELEASE ORAL at 08:39

## 2017-02-04 RX ADMIN — AZITHROMYCIN 250 MG: 250 TABLET, FILM COATED ORAL at 00:04

## 2017-02-04 NOTE — DISCHARGE INSTR - APPOINTMENTS
Please follow up with Dr. Felicitas Brush in one week. Call Monday to confirm appointment time and date.

## 2017-02-04 NOTE — DISCHARGE SUMMARY
DISCHARGE SUMMARY    NAME: Raleigh Sanz   PHYSICIAN: Halima Pink MD  : 1940  MRN: [id number]    ADMITTED: 2017   DISCHARGED:     ADMISSION DIAGNOSES: Bronchitis [J40]  Pain of right lower extremity [M79.604]  DISCHARGE DIAGNOSES:   Problem List Items Addressed This Visit        Respiratory    Bronchitis - Primary      Other Visit Diagnoses     Pain of right lower extremity              SERVICE: Family Medicine. Attending Dr. Bower, Resident Halima Pink MD    CONSULTS:   Consult Orders (all)     Start     Ordered    17  [facility] - Faculty (on-call MD unless specified)  Once     Specialty:  Family Medicine  Provider:  Cyrus Cobian, DO    17          PROCEDURES:   None    HISTORY OF PRESENT ILLNESS:   Patient is a 76 y.o. male presented with complaint of weakness, fatigue, and productive cough, s/p bilateral knee repair in [facility]. His D-dimer was elevated to 3970 at [facility] but patient has been on Xarelto andV/Q scan showed low probability of PE. Patient has productive cough with rhonchi, admitted for sepsis secondary to PNA, most likely healthcare associated, treating with rocephin and azithromycin. .    DIAGNOSTIC DATA:   Lab Results (last 72 hours)     Procedure Component Value Units Date/Time    BNP [[id number]]  (Normal) Collected:  17    Specimen:  Blood Updated:  17     proBNP 182.0 pg/mL     Troponin [[id number]]  (Normal) Collected:  17    Specimen:  Blood Updated:  17     Troponin I 0.014 ng/mL     CK-MB [[id number]]  (Normal) Collected:  17    Specimen:  Blood Updated:  17     CKMB 0.48 ng/mL     Protime-INR [[id number]]  (Abnormal) Collected:  17    Specimen:  Blood Updated:  17     Protime 17.3 (H) Seconds      INR 1.43 (H)     Narrative:       Therapeutic range for most indications is 2.0-3.0 INR,  or 2.5-3.5 for mechanical heart valves.     aPTT [[id number]]  (Abnormal) Collected:  02/01/17 2231    Specimen:  Blood Updated:  02/01/17 2309     PTT 41.1 (H) seconds     Narrative:       The recommended Heparin therapeutic range is 68-97 seconds.    Lactic Acid, Plasma [[id number]]  (Normal) Collected:  02/01/17 2250    Specimen:  Blood Updated:  02/01/17 2316     Lactate 1.3 mmol/L     Comprehensive Metabolic Panel [[id number]]  (Abnormal) Collected:  02/02/17 0623    Specimen:  Blood Updated:  02/02/17 0720     Glucose 112 (H) mg/dL      BUN 38 (H) mg/dL      Creatinine 1.55 (H) mg/dL      Sodium 138 mmol/L      Potassium 4.4 mmol/L      Chloride 101 mmol/L      CO2 26.0 mmol/L      Calcium 8.9 mg/dL      Total Protein 6.9 g/dL      Albumin 3.40 g/dL      ALT (SGPT) 24 U/L      AST (SGOT) 28 U/L      Alkaline Phosphatase 90 U/L      Total Bilirubin 0.8 mg/dL      eGFR Non African Amer 44 mL/min/1.73      Globulin 3.5 gm/dL      A/G Ratio 1.0 (L) g/dL      BUN/Creatinine Ratio 24.5      Anion Gap 11.0 mmol/L     Narrative:       The MDRD GFR formula is only valid for adults with stable renal function between ages 18 and 70.    C-reactive Protein [[id number]]  (Abnormal) Collected:  02/02/17 0623    Specimen:  Blood Updated:  02/02/17 0736     C-Reactive Protein 25.30 (H) mg/dL     CBC Auto Differential [[id number]]  (Abnormal) Collected:  02/02/17 0858    Specimen:  Blood Updated:  02/02/17 0907     WBC 14.50 (H) 10*3/mm3      RBC 3.04 (L) 10*6/mm3      Hemoglobin 8.9 (L) g/dL      Hematocrit 26.3 (L) %      MCV 86.5 fL      MCH 29.3 pg      MCHC 33.8 g/dL      RDW 15.4 (H) %      RDW-SD 48.4 (H) fl      MPV 8.9 fL      Platelets 398 10*3/mm3     CBC & Differential [[id number]] Collected:  02/02/17 0858    Specimen:  Blood Updated:  02/02/17 1259    Narrative:       The following orders were created for panel order CBC & Differential.  Procedure                               Abnormality         Status                     ---------                                -----------         ------                     Manual Differential[[id number]]           Abnormal            Final result               CBC Auto Differential[[id number]]         Abnormal            Final result                 Please view results for these tests on the individual orders.    Manual Differential [[id number]]  (Abnormal) Collected:  02/02/17 0858    Specimen:  Blood Updated:  02/02/17 1259     Neutrophil % 80.0 %      Lymphocyte % 11.0 %      Monocyte % 4.0 %      Basophil % 1.0 %      Bands %  1.0 %      Myelocyte % 1.0 (H) %      Atypical Lymphocyte % 2.0 %      Neutrophils Absolute 11.75 (H) 10*3/mm3      Lymphocytes Absolute 1.60 10*3/mm3      Monocytes Absolute 0.58 10*3/mm3      Basophils Absolute 0.15 10*3/mm3      Anisocytosis Slight/1+      Hypochromia Slight/1+      WBC Morphology Normal      Platelet Estimate Adequate     Respiratory Culture [[id number]] Collected:  02/02/17 1358    Specimen:  Sputum from Cough Updated:  02/02/17 1525     Respiratory Culture Rejected      Gram Stain Result Moderate (3+) WBCs per low power field              Moderate (3+) Epithelial cells per low power field      Mixed bacterial gautam              Specimen rejected based upon microscopic exam of gram stain    CBC & Differential [[id number]] Collected:  02/03/17 0609    Specimen:  Blood Updated:  02/03/17 0708    Narrative:       The following orders were created for panel order CBC & Differential.  Procedure                               Abnormality         Status                     ---------                               -----------         ------                     CBC Auto Differential[[id number]]         Abnormal            Final result                 Please view results for these tests on the individual orders.    CBC Auto Differential [[id number]]  (Abnormal) Collected:  02/03/17 0609    Specimen:  Blood Updated:  02/03/17 0708     WBC 11.09 (H) 10*3/mm3      RBC 2.94 (L) 10*6/mm3      Hemoglobin 8.5 (L) g/dL       Hematocrit 25.6 (L) %      MCV 87.1 fL      MCH 28.9 pg      MCHC 33.2 g/dL      RDW 15.3 (H) %      RDW-SD 49.1 (H) fl      MPV 9.5 fL      Platelets 428 10*3/mm3      Neutrophil % 77.0 %      Lymphocyte % 10.6 %      Monocyte % 7.3 %      Eosinophil % 3.5 %      Basophil % 0.3 %      Immature Grans % 1.3 (H) %      Neutrophils, Absolute 8.54 10*3/mm3      Lymphocytes, Absolute 1.18 10*3/mm3      Monocytes, Absolute 0.81 10*3/mm3      Eosinophils, Absolute 0.39 10*3/mm3      Basophils, Absolute 0.03 10*3/mm3      Immature Grans, Absolute 0.14 (H) 10*3/mm3     Comprehensive Metabolic Panel [[id number]]  (Abnormal) Collected:  02/03/17 0609    Specimen:  Blood Updated:  02/03/17 0713     Glucose 112 (H) mg/dL      BUN 21 mg/dL      Creatinine 1.11 mg/dL      Sodium 139 mmol/L      Potassium 3.9 mmol/L      Chloride 104 mmol/L      CO2 25.0 mmol/L      Calcium 8.9 mg/dL      Total Protein 6.8 g/dL      Albumin 3.40 g/dL      ALT (SGPT) 20 (L) U/L      AST (SGOT) 27 U/L      Alkaline Phosphatase 90 U/L      Total Bilirubin 0.7 mg/dL      eGFR Non African Amer 65 mL/min/1.73      Globulin 3.4 gm/dL      A/G Ratio 1.0 (L) g/dL      BUN/Creatinine Ratio 18.9      Anion Gap 10.0 mmol/L     Narrative:       The MDRD GFR formula is only valid for adults with stable renal function between ages 18 and 70.    Respiratory Culture [[id number]] Collected:  02/03/17 1331    Specimen:  Sputum from Cough Updated:  02/03/17 1859     Gram Stain Result Moderate (3+) WBCs seen       Few (2+) Epithelial cells seen       Mixed bacterial gautam     Blood Culture [[id number]]  (Normal) Collected:  02/01/17 2250    Specimen:  Blood from Arm, Right Updated:  02/03/17 2302     Blood Culture No growth at 2 days     Blood Culture [[id number]]  (Normal) Collected:  02/02/17 0050    Specimen:  Blood from Arm, Left Updated:  02/04/17 0103     Blood Culture No growth at 2 days     Comprehensive Metabolic Panel [[id number]]  (Abnormal) Collected:  02/04/17 0631     Specimen:  Blood Updated:  02/04/17 0735     Glucose 117 (H) mg/dL      BUN 12 mg/dL      Creatinine 0.98 mg/dL      Sodium 140 mmol/L      Potassium 4.2 mmol/L      Chloride 105 mmol/L      CO2 25.0 mmol/L      Calcium 8.5 mg/dL      Total Protein 6.5 g/dL      Albumin 3.40 g/dL      ALT (SGPT) 19 (L) U/L      AST (SGOT) 20 U/L      Alkaline Phosphatase 82 U/L      Total Bilirubin 0.6 mg/dL      eGFR Non African Amer 75 mL/min/1.73      Globulin 3.1 gm/dL      A/G Ratio 1.1 g/dL      BUN/Creatinine Ratio 12.2      Anion Gap 10.0 mmol/L     Narrative:       The MDRD GFR formula is only valid for adults with stable renal function between ages 18 and 70.    CBC & Differential [[id number]] Collected:  02/04/17 0631    Specimen:  Blood Updated:  02/04/17 0815    Narrative:       The following orders were created for panel order CBC & Differential.  Procedure                               Abnormality         Status                     ---------                               -----------         ------                     CBC Auto Differential[[id number]]         Abnormal            Final result                 Please view results for these tests on the individual orders.    CBC Auto Differential [[id number]]  (Abnormal) Collected:  02/04/17 0631    Specimen:  Blood Updated:  02/04/17 0815     WBC 9.52 10*3/mm3      RBC 2.86 (L) 10*6/mm3      Hemoglobin 8.2 (L) g/dL      Hematocrit 25.0 (L) %      MCV 87.4 fL      MCH 28.7 pg      MCHC 32.8 g/dL      RDW 15.3 (H) %      RDW-SD 49.1 (H) fl      MPV 9.7 fL      Platelets 423 10*3/mm3      Neutrophil % 76.7 %      Lymphocyte % 9.2 (L) %      Monocyte % 8.4 %      Eosinophil % 4.2 %      Basophil % 0.3 %      Immature Grans % 1.2 (H) %      Neutrophils, Absolute 7.30 10*3/mm3      Lymphocytes, Absolute 0.88 10*3/mm3      Monocytes, Absolute 0.80 10*3/mm3      Eosinophils, Absolute 0.40 10*3/mm3      Basophils, Absolute 0.03 10*3/mm3      Immature Grans, Absolute 0.11 (H) 10*3/mm3      C-reactive Protein [[id number]]  (Abnormal) Collected:  02/04/17 0631    Specimen:  Blood Updated:  02/04/17 1134     C-Reactive Protein 22.00 (H) mg/dL         Imaging Results (last 72 hours)     Procedure Component Value Units Date/Time    XR Chest 2 View [[id number]] Collected:  02/02/17 0000     Updated:  02/02/17 0002    Narrative:       Exam: PA lateral chest    Indication: Cough    Comparison: 5/20/2015    Findings: PA lateral chest. Mild thoracic spondylosis. The cardiomediastinal silhouette is unremarkable. Lungs are clear. No pneumothorax or pleural effusion.      Impression:       Impression: No acute cardiopulmonary abnormality.    Electronically signed by:  Rogelio Israel MD  2/2/2017 12:01 AM CST      XR Knee 4+ View Right [[id number]] Collected:  02/02/17 0001     Updated:  02/02/17 0004    Narrative:       Exam: Right knee three views    Indication: Pain    Findings: Three views. Status post total right knee arthroplasty. No acute fracture or evidence of prostatic loosening. No lytic or destructive lesion. A joint effusion is present.      Impression:       Impression: No acute bony abnormality.    Electronically signed by:  Rogelio Israel MD  2/2/2017 12:03 AM CST      NM Lung Ventilation Perfusion [[id number]] Collected:  02/02/17 1211     Updated:  02/02/17 1214    Narrative:         [facility] [place]    Patient Name: BOBY OLEA    ORDERING: MABEL THOMPSON    ATTENDING: MAYTE SILVA     REFERRING: MABEL THOMPSON  -----------------------    PROCEDURE: V/Q scan on 2/2/2017    CLINICAL INDICATION:  Elevated d-dimer, shortness of breath    COMPARISON: Chest x-ray from 2/1/2017     FINDINGS: Following the oral inhalation of 32.7 mCi of technetium 99m-DTPA aerosolized, multiple projections of the chest are obtained. Then following the intravenous administration of 6.4 mCi of technetium 99m-MAA, the same multiple projections of the   chest are obtained. There is some central  trapping of tracer on ventilation imaging. No significant perfusion defect is noted. There is no area of perfusion that is worse than ventilation.      Impression:       CONCLUSION: Low probability for pulmonary embolus.    Electronically signed by:  Lexx Segura MD  2/2/2017 12:13 PM [facility]             HOSPITAL COURSE:  Patient was started on antibiotics and given some fluid rescucitation. He never had any fevers and his BP, though initially hypotensive, has recovered and patient has now had good blood pressures. His lung sounds are improving and patient denies any dyspnea. His cough has improved from initial presentation. He had not been taking his coreg as per his physicians recommendations prior to admission, now safe for patient to restart and check his BP at home to ensure he does not become hypotensive. During hospital stay patient had an acute gouty flare with initiation of indomethacin. Likely this is the reason for his continued elevated CRP especially as patient is two weeks post/op from a right knee replacement.    DISCHARGE CONDITION:   Stable    DISPOSITION:  To home    DISCHARGE MEDICATIONS   Raleigh Sanz   Home Medication Instructions SESAR:[id number]    Printed on:02/04/17 4004   Medication Information                      acetaminophen (TYLENOL) 500 MG tablet  Take 1,000 mg by mouth Every 6 (Six) Hours As Needed for mild pain (1-3).             allopurinol (ZYLOPRIM) 300 MG tablet  Take 300 mg by mouth Daily.             aspirin 81 MG chewable tablet  Chew 81 mg Daily.             azithromycin (ZITHROMAX) 250 MG tablet  Take 1 tablet by mouth Daily. Indications: Bronchitis             carvedilol (COREG) 3.125 MG tablet  Take 3.125 mg by mouth 2 (Two) Times a Day With Meals.             cefdinir (OMNICEF) 300 MG capsule  Take 1 capsule by mouth 2 (Two) Times a Day.             indomethacin SR (INDOCIN SR) 75 MG CR capsule  Take 75 mg by mouth Daily.             pantoprazole (PROTONIX) 40  MG EC tablet  Take 40 mg by mouth Daily.             rivaroxaban (XARELTO) 10 MG tablet  Take 10 mg by mouth Daily.             spironolactone (ALDACTONE) 25 MG tablet  Take 25 mg by mouth Daily.             tamsulosin (FLOMAX) 0.4 MG capsule 24 hr capsule  Take 1 capsule by mouth Daily.             valsartan-hydrochlorothiazide (DIOVAN-HCT) 80-12.5 MG per tablet  Take 1 tablet by mouth Daily.                 INSTRUCTIONS:  Activity: as tolerated  Diet: Regular diet, avoid alcohol, chocolate, spicey foods for GERD.  Special instructions: Patient instructed to call MD or return to ED with worsening shortness of breath, chest pain, fever greater than 100.4 degrees F or any other medical concerns.    FOLLOW UP: With Felicitas Brush DO in 2-4 days, preferably between Monday and Wednesday 2/6-2/8/2017     /PENDING TEST RESULTS AT DISCHARGE   Order Current Status    Blood Culture Preliminary result    Blood Culture Preliminary result    Respiratory Culture Preliminary result          Time: Discharge 30 min    Dr. Bower is the attending at time of discharge, he is aware of the patient's status and agrees with the above discharge summary.          This document has been electronically signed by Halima Pink MD on February 4, 2017 12:19 PM

## 2017-02-04 NOTE — PROGRESS NOTES
FAMILY MEDICINE DAILY PROGRESS NOTE  NAME: Raleigh Sanz  : 1940  MRN: [id number]     LOS: 0 days     PROVIDER OF SERVICE: Halima Pink MD    Chief Complaint: Sepsis due to pneumonia    Subjective:     Interval History:  History taken from: patient  Patient Complaints: His toe is very painful and he is not able to walk on it. Otherwise much improved.      Review of Systems:   Review of Systems   Constitutional: Negative.    HENT: Negative for congestion and postnasal drip.    Eyes: Negative.    Respiratory: Positive for cough. Negative for shortness of breath.    Gastrointestinal: Negative.    Endocrine: Negative.    Genitourinary: Positive for frequency. Negative for dysuria.   Musculoskeletal: Positive for joint swelling.   Skin: Negative.    Allergic/Immunologic: Negative.    Neurological: Negative.    Hematological: Negative.    Psychiatric/Behavioral: Negative.        Objective:     Vital Signs  Temp:  [96.7 °F (35.9 °C)-99 °F (37.2 °C)] 96.8 °F (36 °C)  Heart Rate:  [] 99  Resp:  [18-20] 18  BP: (114-138)/(56-66) 138/66    Physical Exam  Physical Exam   Constitutional: He is oriented to person, place, and time. He appears well-developed and well-nourished.   HENT:   Head: Normocephalic and atraumatic.   Eyes: Conjunctivae, EOM and lids are normal. Pupils are equal, round, and reactive to light.   Neck: Normal range of motion. Neck supple.   Cardiovascular: Normal rate, regular rhythm and normal heart sounds.    Pulmonary/Chest: Effort normal. No respiratory distress. He has no wheezes. He has rales.   Abdominal: Soft. Normal appearance and bowel sounds are normal. There is no tenderness.   Musculoskeletal: Normal range of motion.        Neurological: He is alert and oriented to person, place, and time.   Skin: Skin is warm, dry and intact.   Psychiatric: He has a normal mood and affect. His speech is normal and behavior is normal. Judgment and thought content normal.  Cognition and memory are normal.       Medication Review    Current Facility-Administered Medications:   •  acetaminophen (TYLENOL) tablet 500 mg, 500 mg, Oral, TID, Henry Montes MD, 500 mg at 02/03/17 2057  •  acetaminophen (TYLENOL) tablet 650 mg, 650 mg, Oral, Q4H PRN, Henry Montes MD, 650 mg at 02/04/17 0350  •  allopurinol (ZYLOPRIM) tablet 300 mg, 300 mg, Oral, Daily, Henry Montes MD, 300 mg at 02/03/17 0825  •  azithromycin (ZITHROMAX) tablet 250 mg, 250 mg, Oral, Q24H, Henry Montes MD, 250 mg at 02/04/17 0004  •  bisacodyl (DULCOLAX) EC tablet 5 mg, 5 mg, Oral, Daily PRN, Henry Montes MD  •  bisacodyl (DULCOLAX) suppository 10 mg, 10 mg, Rectal, Daily PRN, Henry Montes MD  •  cefTRIAXone (ROCEPHIN) 1 g/100 mL 0.9% NS (MBP), 1 g, Intravenous, Q24H, Halima Pink MD, Last Rate: 0 mL/hr at 02/03/17 0712, 1 g at 02/04/17 0605  •  cyclobenzaprine (FLEXERIL) tablet 5 mg, 5 mg, Oral, TID PRN, Henry Montes MD, 5 mg at 02/02/17 2151  •  guaiFENesin (ROBITUSSIN) 100 MG/5ML oral solution 200 mg, 200 mg, Oral, Q4H PRN, Henry Montes MD, 200 mg at 02/02/17 0309  •  hydrochlorothiazide (MICROZIDE) capsule 12.5 mg, 12.5 mg, Oral, Q24H, Henry Montes MD, 12.5 mg at 02/03/17 0825  •  indomethacin (INDOCIN) capsule 75 mg, 75 mg, Oral, BID With Meals, Halima Pink MD, 75 mg at 02/03/17 1450  •  ipratropium-albuterol (DUO-NEB) nebulizer solution 3 mL, 3 mL, Nebulization, 4x Daily - RT, Jamie Tamiko, MD, 3 mL at 02/03/17 2042  •  ondansetron (ZOFRAN) injection 4 mg, 4 mg, Intravenous, Q6H PRN, Henry Montes MD  •  pantoprazole (PROTONIX) EC tablet 40 mg, 40 mg, Oral, Daily, Henry Montes MD, 40 mg at 02/03/17 0825  •  rivaroxaban (XARELTO) tablet 10 mg, 10 mg, Oral, Once, Jamie Morrison MD, 10 mg at 02/02/17 0101  •  rivaroxaban (XARELTO) tablet 10 mg, 10 mg, Oral, Daily, eHnry Montes MD, 10 mg at 02/03/17 1722  •  sodium chloride 0.9 % flush 1-10 mL, 1-10 mL, Intravenous, PRN, Henry  MD Simon  •  sodium chloride 0.9 % infusion, 100 mL/hr, Intravenous, Continuous, Henry Montes MD, Last Rate: 100 mL/hr at 02/03/17 2248, 100 mL/hr at 02/03/17 2248  •  spironolactone (ALDACTONE) tablet 25 mg, 25 mg, Oral, Daily, Henry Montes MD, 25 mg at 02/03/17 0825  •  tamsulosin (FLOMAX) 24 hr capsule 0.4 mg, 0.4 mg, Oral, Daily, Henry Montes MD, 0.4 mg at 02/03/17 0825  •  valsartan (DIOVAN) tablet 80 mg, 80 mg, Oral, Q24H, Henry Montes MD, 80 mg at 02/03/17 0825     Diagnostic Data    Lab Results (last 24 hours)     Procedure Component Value Units Date/Time    Respiratory Culture [[id number]] Collected:  02/03/17 1331    Specimen:  Sputum from Cough Updated:  02/03/17 1859     Gram Stain Result Moderate (3+) WBCs seen       Few (2+) Epithelial cells seen       Mixed bacterial gautam     Blood Culture [[id number]]  (Normal) Collected:  02/01/17 2250    Specimen:  Blood from Arm, Right Updated:  02/03/17 2302     Blood Culture No growth at 2 days     Blood Culture [[id number]]  (Normal) Collected:  02/02/17 0050    Specimen:  Blood from Arm, Left Updated:  02/04/17 0103     Blood Culture No growth at 2 days     Comprehensive Metabolic Panel [[id number]]  (Abnormal) Collected:  02/04/17 0631    Specimen:  Blood Updated:  02/04/17 0735     Glucose 117 (H) mg/dL      BUN 12 mg/dL      Creatinine 0.98 mg/dL      Sodium 140 mmol/L      Potassium 4.2 mmol/L      Chloride 105 mmol/L      CO2 25.0 mmol/L      Calcium 8.5 mg/dL      Total Protein 6.5 g/dL      Albumin 3.40 g/dL      ALT (SGPT) 19 (L) U/L      AST (SGOT) 20 U/L      Alkaline Phosphatase 82 U/L      Total Bilirubin 0.6 mg/dL      eGFR Non African Amer 75 mL/min/1.73      Globulin 3.1 gm/dL      A/G Ratio 1.1 g/dL      BUN/Creatinine Ratio 12.2      Anion Gap 10.0 mmol/L     Narrative:       The MDRD GFR formula is only valid for adults with stable renal function between ages 18 and 70.    CBC & Differential [[id number]] Collected:  02/04/17 0631     Specimen:  Blood Updated:  02/04/17 0815    Narrative:       The following orders were created for panel order CBC & Differential.  Procedure                               Abnormality         Status                     ---------                               -----------         ------                     CBC Auto Differential[[id number]]         Abnormal            Final result                 Please view results for these tests on the individual orders.    CBC Auto Differential [[id number]]  (Abnormal) Collected:  02/04/17 0631    Specimen:  Blood Updated:  02/04/17 0815     WBC 9.52 10*3/mm3      RBC 2.86 (L) 10*6/mm3      Hemoglobin 8.2 (L) g/dL      Hematocrit 25.0 (L) %      MCV 87.4 fL      MCH 28.7 pg      MCHC 32.8 g/dL      RDW 15.3 (H) %      RDW-SD 49.1 (H) fl      MPV 9.7 fL      Platelets 423 10*3/mm3      Neutrophil % 76.7 %      Lymphocyte % 9.2 (L) %      Monocyte % 8.4 %      Eosinophil % 4.2 %      Basophil % 0.3 %      Immature Grans % 1.2 (H) %      Neutrophils, Absolute 7.30 10*3/mm3      Lymphocytes, Absolute 0.88 10*3/mm3      Monocytes, Absolute 0.80 10*3/mm3      Eosinophils, Absolute 0.40 10*3/mm3      Basophils, Absolute 0.03 10*3/mm3      Immature Grans, Absolute 0.11 (H) 10*3/mm3             I reviewed the patient's new clinical results.    Assessment/Plan:   Raleigh Sanz is a 76 y.o. male who presents with complaint of weakness, fatigue, and productive cough, s/p bilateral knee repair in [facility]. His D-dimer was elevated to 3970 at [facility] but patient has been on Xarelto,  andV/Q scan showed low probability of PE. Patient has productive cough with rhonchi, admitted for sepsis secondary to PNA, most likely healthcare associated, treating with rocephin and azithromycin. His blood pressure was low and his cardiologist had him stop his coreg, will restart once patients blood pressure improves. Blood pressure currently stable, patient has been normotensive.      Active Hospital  Problems (** Indicates Principal Problem)    Diagnosis Date Noted   • **Sepsis due to pneumonia [J18.9, A41.9] 02/02/2017   • Bronchitis [J40] 02/02/2017   • Acute renal failure [N17.9] 02/02/2017   • SAMANTA on CPAP [G47.33] 02/02/2017   • Chronic GERD [K21.9] 02/02/2017   • Chronic gout [M1A.9XX0] 02/02/2017   • Benign prostatic hyperplasia without lower urinary tract symptoms [N40.0] 02/02/2017   • Shortness of breath [R06.02] 02/02/2017   • Elevated d-dimer [R79.1] 02/02/2017   • S/P knee surgery [Z98.890] 02/02/2017      Resolved Hospital Problems    Diagnosis Date Noted Date Resolved   No resolved problems to display.         DVT prophylaxis: Xarelto  Code status is Full Code    Plan for disposition:Where: home and When:  today      Time: 30 mins           This document has been electronically signed by Halima Pink MD on February 4, 2017 7:00 AM

## 2017-02-04 NOTE — PLAN OF CARE
Problem: Patient Care Overview (Adult)  Goal: Plan of Care Review  Outcome: Ongoing (interventions implemented as appropriate)    02/04/17 8353   Outcome Evaluation   Outcome Summary/Follow up Plan Gout flair up on right foot 2nd toe is causing patient some pain       Goal: Adult Individualization and Mutuality  Outcome: Ongoing (interventions implemented as appropriate)    Problem: COPD, Chronic Bronchitis/Emphysema (Adult)  Goal: Signs and Symptoms of Listed Potential Problems Will be Absent or Manageable (COPD, Chronic Bronchitis/Emphysema)  Outcome: Ongoing (interventions implemented as appropriate)    Problem: Pressure Ulcer Risk (Kendall Scale) (Adult,Obstetrics,Pediatric)  Goal: Identify Related Risk Factors and Signs and Symptoms  Outcome: Ongoing (interventions implemented as appropriate)  Goal: Skin Integrity  Outcome: Ongoing (interventions implemented as appropriate)

## 2017-02-06 LAB
BACTERIA SPEC AEROBE CULT: NORMAL
BACTERIA SPEC RESP CULT: NORMAL
GRAM STN SPEC: NORMAL

## 2017-02-07 LAB — BACTERIA SPEC AEROBE CULT: NORMAL

## 2017-03-08 ENCOUNTER — TRANSCRIBE ORDERS (OUTPATIENT)
Dept: PHYSICAL THERAPY | Facility: HOSPITAL | Age: 77
End: 2017-03-08

## 2017-03-08 DIAGNOSIS — M17.0 PRIMARY OSTEOARTHRITIS OF BOTH KNEES: Primary | ICD-10-CM

## 2017-03-15 ENCOUNTER — HOSPITAL ENCOUNTER (OUTPATIENT)
Dept: PHYSICAL THERAPY | Facility: HOSPITAL | Age: 77
Setting detail: THERAPIES SERIES
Discharge: HOME | End: 2017-03-15

## 2017-03-15 ENCOUNTER — TRANSCRIBE ORDERS (OUTPATIENT)
Dept: PHYSICAL THERAPY | Facility: HOSPITAL | Age: 77
End: 2017-03-15

## 2017-03-15 ENCOUNTER — CLINICAL SUPPORT (OUTPATIENT)
Dept: AUDIOLOGY | Facility: CLINIC | Age: 77
End: 2017-03-15

## 2017-03-15 DIAGNOSIS — Z46.1 ENCOUNTER FOR FITTING OR ADJUSTMENT OF HEARING AID: Primary | ICD-10-CM

## 2017-03-15 DIAGNOSIS — M25.562 BILATERAL CHRONIC KNEE PAIN: Primary | ICD-10-CM

## 2017-03-15 DIAGNOSIS — Z96.653 STATUS POST TOTAL BILATERAL KNEE REPLACEMENT: Primary | ICD-10-CM

## 2017-03-15 DIAGNOSIS — G89.29 BILATERAL CHRONIC KNEE PAIN: Primary | ICD-10-CM

## 2017-03-15 DIAGNOSIS — M25.561 BILATERAL CHRONIC KNEE PAIN: Primary | ICD-10-CM

## 2017-03-15 PROCEDURE — HEARINGNOCHG: Performed by: AUDIOLOGIST

## 2017-03-15 PROCEDURE — 97161 PT EVAL LOW COMPLEX 20 MIN: CPT | Performed by: PHYSICAL THERAPIST

## 2017-03-15 PROCEDURE — G8978 MOBILITY CURRENT STATUS: HCPCS | Performed by: PHYSICAL THERAPIST

## 2017-03-15 PROCEDURE — G8979 MOBILITY GOAL STATUS: HCPCS | Performed by: PHYSICAL THERAPIST

## 2017-03-15 PROCEDURE — 97110 THERAPEUTIC EXERCISES: CPT | Performed by: PHYSICAL THERAPIST

## 2017-03-15 NOTE — PROGRESS NOTES
Outpatient Physical Therapy Ortho Initial Evaluation  [facility]     Patient Name: Raleigh Sanz  : 1940  MRN: [id number]  Today's Date: 3/15/2017      Visit Date: 03/15/2017  Attendance:   Subjective % Improvement: 0%  Recert Date: 2017  MD appointment: 3/28/2017    Patient Active Problem List   Diagnosis   • Calcaneal spur   • Bronchitis   • SAMANTA on CPAP   • Chronic GERD   • Chronic gout   • Benign prostatic hyperplasia without lower urinary tract symptoms   • S/P knee surgery   • Sepsis due to pneumonia        Past Medical History   Diagnosis Date   • Achilles tendinitis    • Acute atopic conjunctivitis    • Astigmatism      refractive change     • Atopic conjunctivitis    • Borderline glaucoma    • Calcaneal spur    • Cardiomyopathy    • Carpal tunnel syndrome    • Conjunctivitis      allergic   • Coronary arteriosclerosis    • Cortical senile cataract      mild   • Degenerative joint disease      involving multiple joints    • Disease of gallbladder       s/p open jose for gangrenous cholecystitis   • Diverticular disease of colon       s/p hartmans and now take down colostomy      • Essential hypertension    • Foot pain      history of stress fracture right foot      • GERD (gastroesophageal reflux disease)    • Gouty arthropathy    • Hearing loss      wears hearing aids      • Heel pain      posterior   • History of colonic polyps    • History of echocardiogram 2014     Echocardiogram W/ color flow 98173 (Mild CLVH. Decreased LV systolic function with EF of 35%. Mitral and AV mildly thickened.Aortic sclerosis.Tricuspid intact.)   • History of echocardiogram 2010     Echocadiogram W/O color flow 49658 (Global hypokinesis with EF 35%. Left atrium appears to be dilated. Aortic root normal. Early diastolic dysfunctin. Mild mitral & tricuspid regurgitation. Mild pulmonic egurgitation. RV pressure approx. 30-35 mmHg.)   • Hyperlipidemia    • Incisional hernia      s/p  repair with mesh    • Keratoconjunctivitis sicca    • Myopia    • Onychogryposis    • Onycholysis    • Onychomycosis    • Pain in toe    • Plantar fasciitis      chronic   • Presbyopia    • Sleep apnea    • Vitreous detachment      history of posterior right eye      • Vitreous floaters      history of right eye        Past Surgical History   Procedure Laterality Date   • Cardiac catheterization  05/22/2015     Cardiac cath 06712 (Negative FFR evaluation with a value 0.90 which was not hemdynamically significant with symptoms of chest discomfort.)   • Cardiac catheterization  07/09/2010     Cardiac cath 90824 (Small caliber 3rd obtuse marginal branch which was subtotally occluded and filling in through left-to-left collateral. Proximal LAD with 30% stenosis. lst diagonal branch with 40-50% stenosis with evidence of good BOBY 3 flow. Diffusely diseased RCA)   • Carpal tunnel release  11/21/2003     Carpal tunnel surgery (Bilateral carpal tunnel release, endoscopic.)   • Cholecystectomy     • Colonoscopy  12/02/2015     Colonoscopy remove polyps 78397 (A single polyp was found in the ascending and the transverse colon;removed by hot biopsy polypectomy.)   • Colonoscopy  12/03/2014     Colonoscopy remove polyps 45248 (A single polyp was found in the cecum. Removed by hot biopsy polypectomy. Single polyp in the transverse colon.Removed by hot biopsy polypectomy.Diverticulosis found in the desc.colon.Rectal stump and distal sig.)   • Colostomy  12/04/2014     Descending colostomy takedown with primary anastomosis.   • Cystoscopy  12/04/2014     Cystoscopy (With bilateral retrogrades. Right double J stent placement. Ureteroscopy on left.)   • Other surgical history  01/22/2014   • Other surgical history       DEBRIDE NAIL 6 OR MORE 12813 (Onychogryposis, Pain in toe, Onychomycosis) (6): 04/24/2015, 01/25/2015, 10/21/2014, 04/22/2014, 01/22/2014   • Other surgical history  04/29/2014     EXTENDED VISUAL FIELDS STUDY 21874  (Borderline glaucoma)    • Knee arthroscopy  07/17/1997     Knee arthroscopy, surgery (Medial meniscectomy. Arthritis, medial compartment; torn medial meniscus.)   • Exploratory laparotomy  09/03/2014     sigmoid colectomy with descending colostomy or Fitz's procedure. Incidental appendectomy.   • Other surgical history       OCT DISC NFL 33140 (Borderline glaucoma) (2): 05/06/2015, 04/24/2013   • Joint replacement Left 11/18/2016   • Joint replacement Right Jannuary 16, 2017     Knee       Visit Dx:     ICD-10-CM ICD-9-CM   1. Status post total bilateral knee replacement Z96.653 V43.65             Patient History       03/15/17 0810          History    Chief Complaint Muscle weakness;Difficulty Walking;Fatigue/poor endurance  -BB      Date Current Problem(s) Began --   11-18-16 left knee, 1-16-17 right knee  -BB      Brief Description of Current Complaint Reports knees where bone on bone. Had first surgery on left 11-18-16 and surgery on right 1-16-17 with no physical therapy at this point. Reports doing PT at home.    -BB      Onset Date- PT --   11-18-16 left knee, 1-16-17 right knee  -BB      Patient/Caregiver Goals Improve strength;Return to prior level of function  -BB      Patient's Rating of General Health Good  -BB      Hand Dominance right-handed  -BB      Occupation/sports/leisure activities Retired  -BB      What clinical tests have you had for this problem? X-ray  -BB      Results of Clinical Tests Per patient no abnormalities  -BB      Pain     Pain Location Knee  -BB      Pain at Present 0  -BB      Pain at Best 0  -BB      Pain at Worst 0  -BB      Pain Comments Patient denies having pain with anything. Reports doing anything that makes it hurt  -BB      Tolerance Time- Standing 45 minutes to 1 hour  -BB      Tolerance Time- Sitting unlimited   -BB      Tolerance Time- Walking 1-2 hours   -BB      Tolerance Time- Lying unlimited   -BB      Is your sleep disturbed? No  -BB      Is medication used  to assist with sleep? Yes   occasional tylenol   -BB      Fall Risk Assessment    Any falls in the past year: No  -BB        User Key  (r) = Recorded By, (t) = Taken By, (c) = Cosigned By    Initials Name Provider Type    BB Chelita Harding, PT Physical Therapist                PT Ortho       03/15/17 0810    Subjective Comments    Subjective Comments Reports knees where bone on bone. Had first surgery on left 11-18-16 and surgery on right 1-16-17 with no physical therapy at this point. Reports doing PT at home.  Reports doing everything he wants to do. Has used pain as guide with all things at home. Reports that MD doesnt believe in PT and wants to start with bike for 5 minutes, no harsh activities or machines for motion. Reports when he gets tired he sits down. Denies using an assistive device.   -BB    Precautions and Contraindications    Precautions/Limitations other (see comments)   MD office sent protocol-available in chart  -BB    Precautions RTKA: 1-16-17, LTKA:11-18-16  -BB    Contraindications Protocol in chart-no aggresive ROM or strengthening  -BB    Subjective Pain    Post-Treatment Pain Level 0  -BB    Posture/Observations    Posture- WNL Posture is WNL  -BB    Posture/Observations Comments Slight antalgic gait noted. No assistive device. Incisions healed.   -BB    Sensation    Sensation WNL? WNL  -BB    Light Touch No apparent deficits  -BB    Quarter Clearing    Quarter Clearing Lower Quarter Clearing  -BB    Sensory Screen for Light Touch- Lower Quarter Clearing    L2 (anterior mid thigh) Intact;Bilateral:  -BB    L3 (distal anterior thigh) Bilateral:;Intact  -BB    L4 (medial lower leg/foot) Bilateral:;Intact  -BB    L5 (lateral lower leg/great toe) Bilateral:;Intact  -BB    Special Tests/Palpation    Special Tests/Palpation Knee   Special tests: Not appropriate  -BB    Knee Palpation    Knee Palpation? Yes   denies any significant ttp.Slight jt line R>L   -BB    Patellar Accessory Motions     Patellar Accessory Motions Tested? Yes  -BB    Superior glide Right:;Left:;WNL   right increased tenderness   -BB    Inferior glide Right:;Left:;WNL   right increased tenderness  -BB    Medial glide Right:;Left:;WNL  -BB    Lateral glide Right:;Left:;WNL  -BB    ROM (Range of Motion)    General ROM lower extremity range of motion deficits identified  -BB    General ROM Detail Right knee: 0-110, left knee -2-110   -BB    MMT (Manual Muscle Testing)    General MMT Assessment Detail bilateral knee flex/ext: 5/5, right knee flex:4+/5 with pull noted in HS, left hip flex 4/5 with pain, right hip flex 4/5. Bilateral quad lag with SLR of 5 degrees or greater.   -BB    Girth    Girth Measured? Left Lower Extremity;Right Lower Extremity  -BB    RLE Quick Girth (cm)    Tib tuberosity 40.8 cm  -BB    Mid patella 46 cm  -BB    Distal thigh 49 cm  -BB    LLE Quick Girth (cm)    Tib tuberosity 41 cm  -BB    Mid patella 45.5 cm  -BB    Distal thigh 48 cm  -BB    Balance Skills Training    SLS Not tested at this time  -BB      User Key  (r) = Recorded By, (t) = Taken By, (c) = Cosigned By    Initials Name Provider Type    MARIANO Harding PT Physical Therapist                            Therapy Education       03/15/17 0810          Therapy Education    Given HEP;Symptoms/condition management  -BB      Program New  -BB      How Provided Verbal  -BB      Provided to Patient;Caregiver   wife   -BB      Level of Understanding Verbalized;Demonstrated  -BB        User Key  (r) = Recorded By, (t) = Taken By, (c) = Cosigned By    Initials Name Provider Type    MARIAON Harding, PT Physical Therapist                PT OP Goals       03/15/17 0810       PT Short Term Goals    STG Date to Achieve 04/12/17  -BB     STG 1 Independent in HEP   -BB     STG 1 Progress New  -BB     STG 2 No lag with SLR bilaterally  -BB     STG 2 Progress New  -BB     STG 3 Bilateral knee ROM of 120 degrees or better  -BB     STG 3 Progress New  -BB     STG 4 SLS  bilaterally 5 seconds on level ground  -BB     STG 4 Progress New  -BB     STG 5 Complete 6' walk test with no increased pain or antalgics  -BB     STG 5 Progress New  -BB     Time Calculation    PT Goal Re-Cert Due Date 04/05/17  -BB       User Key  (r) = Recorded By, (t) = Taken By, (c) = Cosigned By    Initials Name Provider Type    BB Chelita Harding, PT Physical Therapist                PT Assessment/Plan       03/15/17 0810       PT Assessment    Functional Limitations Impaired gait;Limitations in functional capacity and performance  -BB     Impairments Balance;Gait;Impaired muscle endurance;Impaired muscle power;Joint mobility;Muscle strength;Pain;Range of motion  -BB     Assessment Comments Patient presents post operatively of bilateral knees with left on 11-18-16 and right on 1-16-17 with no history of PT at this point. Patient has been doing HEP at home. Patient presents with decreased ROM and fucntional strength bilaterally. No significant pain and is hesistant about PT at this time.  Patient tolerated treatment well with no complaints this visit.  -BB     Please refer to paper survey for additional self-reported information Yes  -BB     Rehab Potential Good  -BB     Patient/caregiver participated in establishment of treatment plan and goals Yes  -BB     Patient would benefit from skilled therapy intervention Yes  -BB     PT Plan    PT Frequency 2x/week  -BB     Predicted Duration of Therapy Intervention (days/wks) 3 weeks  -BB     Planned CPT's? PT EVAL LOW COMPLEXITY: 67897;PT RE-EVAL: 09174;PT THER PROC EA 15 MIN: 28514;PT MANUAL THERAPY EA 15 MIN: 18668;PT GAIT TRAINING EA 15 MIN: 49957;PT THER SUPP EA 15 MIN;PT ELECTRICAL STIM UNATTEND:   -BB     Physical Therapy Interventions (Optional Details) stretching;strengthening;stair training;ROM (Range of Motion);modalities;manual therapy techniques;joint mobilization;home exercise program;gait training;balance training  -BB     PT Plan Comments Progress  "strength and ROM gently with pain as guide. Modalities PRN for pain   -BB       User Key  (r) = Recorded By, (t) = Taken By, (c) = Cosigned By    Initials Name Provider Type    BB Chelita Harding PT Physical Therapist                Modalities       03/15/17 0810          Ice    Patient reports will apply ice at home to involved area Yes  -BB        User Key  (r) = Recorded By, (t) = Taken By, (c) = Cosigned By    Initials Name Provider Type    BB Chelita Harding, PT Physical Therapist              Exercises       03/15/17 0810          Subjective Comments    Subjective Comments Reports knees where bone on bone. Had first surgery on left 11-18-16 and surgery on right 1-16-17 with no physical therapy at this point. Reports doing PT at home.  Reports doing everything he wants to do. Has used pain as guide with all things at home. Reports that MD doesnt believe in PT and wants to start with bike for 5 minutes, no harsh activities or machines for motion. Reports when he gets tired he sits down. Denies using an assistive device.   -BB      Subjective Pain    Able to rate subjective pain? yes  -BB      Pre-Treatment Pain Level 0  -BB      Post-Treatment Pain Level 0  -BB      Exercise 1    Exercise Name 1 SLR bilaterally   -BB      Sets 1 1  -BB      Reps 1 10  -BB      Exercise 2    Exercise Name 2 Quad Sets Bilaterally   -BB      Sets 2 1  -BB      Reps 2 15  -BB      Time (Seconds) 2 3\" hold   -BB      Exercise 3    Exercise Name 3 Pro 2 level 1 seat 11   -BB      Time (Minutes) 3 5  -BB      Exercise 4    Exercise Name 4 incline stretch baby bear  -BB      Sets 4 3  -BB      Time (Seconds) 4 30\"  -BB      Exercise 5    Exercise Name 5 Standing HS stretch bilaterally  -BB      Sets 5 3  -BB      Time (Seconds) 5 30\"  -BB        User Key  (r) = Recorded By, (t) = Taken By, (c) = Cosigned By    Initials Name Provider Type    BB Chelita Harding, PT Physical Therapist                              Outcome Measures       03/15/17 " 0810          Lower Extremity Functional Index    Any of your usual work, housework or school activities 3  -BB      Your usual hobbies, recreational or sporting activities 4  -BB      Getting into or out of the bath 4  -BB      Walking between rooms 3  -BB      Putting on your shoes or socks 3  -BB      Squatting 4  -BB      Lifting an object, like a bag of groceries from the floor 4  -BB      Performing light activities around your home 3  -BB      Performing heavy activities around your home 3  -BB      Getting into or out of a car 4  -BB      Walking 2 blocks 3  -BB      Walking a mile 3  -BB      Going up or down 10 stairs (about 1 flight of stairs) 3  -BB      Standing for 1 hour 3  -BB      Sitting for 1 hour 4  -BB      Running on even ground 0  -BB      Running on uneven ground 0  -BB      Making sharp turns while running fast 0  -BB      Hopping 0  -BB      Rolling over in bed 4  -BB      Total 55  -BB      Functional Assessment    Outcome Measure Options Lower Extremity Functional Scale (LEFS)  -BB        User Key  (r) = Recorded By, (t) = Taken By, (c) = Cosigned By    Initials Name Provider Type    BB Chelita Harding PT Physical Therapist            Time Calculation:   Start Time: 0810  Stop Time: 0856  Time Calculation (min): 46 min  Total Timed Code Minutes- PT: 20 minute(s)     Therapy Charges for Today     Code Description Service Date Service Provider Modifiers Qty    67861793932 HC PT MOBILITY CURRENT 3/15/2017 Chelita Harding, PT GP, CJ 1    12304884977 HC PT MOBILITY PROJECTED 3/15/2017 Chelita Harding, PT GP, CI 1    93390681174 HC PT EVAL LOW COMPLEXITY 1 3/15/2017 Chelita Harding, PT GP 1    50931583903 HC PT THER PROC EA 15 MIN 3/15/2017 Chelita Harding PT GP 1          PT G-Codes  PT Professional Judgement Used?: Yes  Outcome Measure Options: Lower Extremity Functional Scale (LEFS)  Score: 55/80  Functional Limitation: Mobility: Walking and moving around  Mobility: Walking and Moving Around Current  Status (): At least 20 percent but less than 40 percent impaired, limited or restricted  Mobility: Walking and Moving Around Goal Status (): At least 1 percent but less than 20 percent impaired, limited or restricted         Chelita Harding, PT  3/15/2017

## 2017-03-16 NOTE — PROGRESS NOTES
HEARING AID CHECK    Name:  Raleigh Sanz  :  1940  Age:  76 y.o.  Date of Evaluation:  3/16/2017      HISTORY    Reason for visit:  Raleigh Sanz is seen today for a hearing aid check.  Patient reports his left tubing keeps coming out of his ear mold.    Hearing aid history:  Patient is currently wearing a Behind the Ear (BTE) hearing aid in both ear(s).      OFFICE VISIT    During today's visit tubing was replace in his left ear mold.  He will return for hearing aid assistance as necessary.     It was a pleasure seeing Raleigh Sanz in Audiology today.  It is a pleasure helping Mr. Sanz with his amplification needs.             This document has been electronically signed by Carmen Graham MS CCC-MIROSLAVA on 2017 5:14 PM       Carmen Graham MS CCC-A  Licensed Audiologist    For Billing and Codin  Hearing Aid Check, Binaural - no charge

## 2017-03-22 ENCOUNTER — HOSPITAL ENCOUNTER (OUTPATIENT)
Dept: PHYSICAL THERAPY | Facility: HOSPITAL | Age: 77
Setting detail: THERAPIES SERIES
Discharge: HOME | End: 2017-03-22

## 2017-03-22 DIAGNOSIS — Z96.653 STATUS POST TOTAL BILATERAL KNEE REPLACEMENT: Primary | ICD-10-CM

## 2017-03-22 PROCEDURE — 97110 THERAPEUTIC EXERCISES: CPT

## 2017-03-22 PROCEDURE — 97140 MANUAL THERAPY 1/> REGIONS: CPT

## 2017-03-22 NOTE — PROGRESS NOTES
Outpatient Physical Therapy Ortho Treatment Note  [facility]     Patient Name: Raleigh Sanz  : 1940  MRN: [id number]  Today's Date: 3/22/2017      Visit Date: 2017     Subjective Improvement: 0%   Visits Attended: 2/2   Visits Approved medicare   MD visit: 17   Recert Date: 17           Visit Dx:    ICD-10-CM ICD-9-CM   1. Status post total bilateral knee replacement Z96.653 V43.65       Patient Active Problem List   Diagnosis   • Calcaneal spur   • Bronchitis   • SAMANTA on CPAP   • Chronic GERD   • Chronic gout   • Benign prostatic hyperplasia without lower urinary tract symptoms   • S/P knee surgery   • Sepsis due to pneumonia        Past Medical History:   Diagnosis Date   • Achilles tendinitis    • Acute atopic conjunctivitis    • Astigmatism     refractive change     • Atopic conjunctivitis    • Borderline glaucoma    • Calcaneal spur    • Cardiomyopathy    • Carpal tunnel syndrome    • Conjunctivitis     allergic   • Coronary arteriosclerosis    • Cortical senile cataract     mild   • Degenerative joint disease     involving multiple joints    • Disease of gallbladder      s/p open jose for gangrenous cholecystitis   • Diverticular disease of colon      s/p hartmans and now take down colostomy      • Essential hypertension    • Foot pain     history of stress fracture right foot      • GERD (gastroesophageal reflux disease)    • Gouty arthropathy    • Hearing loss     wears hearing aids      • Heel pain     posterior   • History of colonic polyps    • History of echocardiogram 2014    Echocardiogram W/ color flow 06970 (Mild [facility]. Decreased LV systolic function with EF of 35%. Mitral and AV mildly thickened.Aortic sclerosis.Tricuspid intact.)   • History of echocardiogram 2010    Echocadiogram W/O color flow 95867 (Global hypokinesis with EF 35%. Left atrium appears to be dilated. Aortic root normal. Early diastolic dysfunctin. Mild mitral & tricuspid  regurgitation. Mild pulmonic egurgitation. RV pressure approx. 30-35 mmHg.)   • Hyperlipidemia    • Incisional hernia     s/p repair with mesh    • Keratoconjunctivitis sicca    • Myopia    • Onychogryposis    • Onycholysis    • Onychomycosis    • Pain in toe    • Plantar fasciitis     chronic   • Presbyopia    • Sleep apnea    • Vitreous detachment     history of posterior right eye      • Vitreous floaters     history of right eye        Past Surgical History:   Procedure Laterality Date   • CARDIAC CATHETERIZATION  05/22/2015    Cardiac cath 55734 (Negative FFR evaluation with a value 0.90 which was not hemdynamically significant with symptoms of chest discomfort.)   • CARDIAC CATHETERIZATION  07/09/2010    Cardiac cath 60688 (Small caliber 3rd obtuse marginal branch which was subtotally occluded and filling in through left-to-left collateral. Proximal LAD with 30% stenosis. lst diagonal branch with 40-50% stenosis with evidence of good BOBY 3 flow. Diffusely diseased RCA)   • CARPAL TUNNEL RELEASE  11/21/2003    Carpal tunnel surgery (Bilateral carpal tunnel release, endoscopic.)   • CHOLECYSTECTOMY     • COLONOSCOPY  12/02/2015    Colonoscopy remove polyps 56921 (A single polyp was found in the ascending and the transverse colon;removed by hot biopsy polypectomy.)   • COLONOSCOPY  12/03/2014    Colonoscopy remove polyps 49080 (A single polyp was found in the cecum. Removed by hot biopsy polypectomy. Single polyp in the transverse colon.Removed by hot biopsy polypectomy.Diverticulosis found in the desc.colon.Rectal stump and distal sig.)   • COLOSTOMY  12/04/2014    Descending colostomy takedown with primary anastomosis.   • CYSTOSCOPY  12/04/2014    Cystoscopy (With bilateral retrogrades. Right double J stent placement. Ureteroscopy on left.)   • EXPLORATORY LAPAROTOMY  09/03/2014    sigmoid colectomy with descending colostomy or Fitz's procedure. Incidental appendectomy.   • JOINT REPLACEMENT Left  11/18/2016   • JOINT REPLACEMENT Right Jannuary 16, 2017    Knee   • KNEE ARTHROSCOPY  07/17/1997    Knee arthroscopy, surgery (Medial meniscectomy. Arthritis, medial compartment; torn medial meniscus.)   • OTHER SURGICAL HISTORY  01/22/2014   • OTHER SURGICAL HISTORY      DEBRIDE NAIL 6 OR MORE 22409 (Onychogryposis, Pain in toe, Onychomycosis) (6): 04/24/2015, 01/25/2015, 10/21/2014, 04/22/2014, 01/22/2014   • OTHER SURGICAL HISTORY  04/29/2014    EXTENDED VISUAL FIELDS STUDY 35403 (Borderline glaucoma)    • OTHER SURGICAL HISTORY      OCT DISC NFL 00394 (Borderline glaucoma) (2): 05/06/2015, 04/24/2013             PT Ortho       03/22/17 1107    Precautions and Contraindications    Precautions TKA R 1/16/17; L 11/18/16  -    Contraindications Protocol in chart  -    Posture/Observations    Posture/Observations Comments No assistive device with improved heel-toe pattern B this date. Good patella mobility this date.   -    General LE Assessment    ROM Detail B Knee AROM 0-115  -    MMT (Manual Muscle Testing)    General MMT Assessment Detail 5 degree quad lag with SLR, improved with VC  -      User Key  (r) = Recorded By, (t) = Taken By, (c) = Cosigned By    Initials Name Provider Type    DANETTE Francis PTA Physical Therapy Assistant                            PT Assessment/Plan       03/22/17 1108       PT Assessment    Assessment Comments Improved AROM in B knees.  Sit-stands challengening but able. Added to HEP.  Also educated pt on importance of no lag with SLR  -     PT Plan    PT Frequency 2x/week  -     Predicted Duration of Therapy Intervention (days/wks) 3 weeks  -     PT Plan Comments Progress strength as able.  MD note the next visit. Increase as pt allows.  Increase time on pro ll next.   -       User Key  (r) = Recorded By, (t) = Taken By, (c) = Cosigned By    Initials Name Provider Type    DANETTE Francis PTA Physical Therapy Assistant                    Exercises       03/22/17 7404  "         Subjective Comments    Subjective Comments No new complaints with knees.  States that they are doing well.  Driving to therapy.   -KH      Subjective Pain    Able to rate subjective pain? yes  -KH      Pre-Treatment Pain Level 0  -KH      Post-Treatment Pain Level 0  -KH      Exercise 1    Exercise Name 1 pro ll level 1  -KH      Time (Minutes) 1 5'  -KH      Exercise 2    Exercise Name 2 incline stretch  -KH      Sets 2 3  -KH      Time (Seconds) 2 30\"  -KH      Exercise 3    Exercise Name 3 CR/TR  -KH      Sets 3 1  -KH      Reps 3 10  -KH      Exercise 4    Exercise Name 4 step ups fwd B  -KH      Sets 4 1  -KH      Reps 4 15  -KH      Exercise 5    Exercise Name 5 SLR-FF B  -KH      Sets 5 2  -KH      Reps 5 10  -KH      Exercise 6    Exercise Name 6 sit-stands no UE  -KH      Sets 6 2  -KH      Reps 6 10  -KH        User Key  (r) = Recorded By, (t) = Taken By, (c) = Cosigned By    Initials Name Provider Type    DANETTE Francis PTA Physical Therapy Assistant                        Manual Rx (last 36 hours)      Manual Treatments       03/22/17 1107          Manual Rx 1    Manual Rx 1 Location B knees  -      Manual Rx 1 Type patella mobes/scar massage for mobility  -      Manual Rx 1 Duration 10'  -KH        User Key  (r) = Recorded By, (t) = Taken By, (c) = Cosigned By    Initials Name Provider Type    DANETTE Francis PTA Physical Therapy Assistant                PT OP Goals       03/22/17 1107       PT Short Term Goals    STG Date to Achieve 04/12/17  -     STG 1 Independent in HEP   -     STG 1 Progress Ongoing  -     STG 2 No lag with SLR bilaterally  -     STG 2 Progress Progressing  -     STG 3 Bilateral knee ROM of 120 degrees or better  -     STG 3 Progress Progressing  -     STG 4 SLS bilaterally 5 seconds on level ground  -     STG 4 Progress New  -     STG 5 Complete 6' walk test with no increased pain or antalgics  -     STG 5 Progress New  -     Time Calculation    " PT Goal Re-Cert Due Date 04/05/17  -DANETTE       User Key  (r) = Recorded By, (t) = Taken By, (c) = Cosigned By    Initials Name Provider Type    DANETTE Francis PTA Physical Therapy Assistant                Therapy Education       03/22/17 1107          Therapy Education    Given HEP   scar massage/sit-stands  -KH      Program New  -DANETTE      How Provided Verbal  -KH      Provided to Patient  -KH      Level of Understanding Demonstrated  -DANETTE        User Key  (r) = Recorded By, (t) = Taken By, (c) = Cosigned By    Initials Name Provider Type    DANETTE Francis PTA Physical Therapy Assistant                Time Calculation:   Start Time: 1107  Stop Time: 1145  Time Calculation (min): 38 min  Total Timed Code Minutes- PT: 38 minute(s)    Therapy Charges for Today     Code Description Service Date Service Provider Modifiers Qty    55250159763 HC PT MANUAL THERAPY EA 15 MIN 3/22/2017 Peg Francis PTA GP 1    81873274700 HC PT THER PROC EA 15 MIN 3/22/2017 Peg Francis PTA GP 2                    Peg Francis PTA  3/22/2017

## 2017-03-24 ENCOUNTER — HOSPITAL ENCOUNTER (OUTPATIENT)
Dept: PHYSICAL THERAPY | Facility: HOSPITAL | Age: 77
Setting detail: THERAPIES SERIES
Discharge: HOME | End: 2017-03-24

## 2017-03-24 DIAGNOSIS — Z96.653 STATUS POST TOTAL BILATERAL KNEE REPLACEMENT: Primary | ICD-10-CM

## 2017-03-24 PROCEDURE — 97110 THERAPEUTIC EXERCISES: CPT

## 2017-03-24 NOTE — PROGRESS NOTES
Outpatient Physical Therapy Ortho Treatment Note  [facility]     Patient Name: Raleigh Sanz  : 1940  MRN: [id number]  Today's Date: 3/24/2017      Visit Date: 2017    Subjective Improvement: 20%   Visits Attended: 3/3   Visits Approved medicare MD visit: 17   Recert Date: 17         Visit Dx:    ICD-10-CM ICD-9-CM   1. Status post total bilateral knee replacement Z96.653 V43.65       Patient Active Problem List   Diagnosis   • Calcaneal spur   • Bronchitis   • SAMANTA on CPAP   • Chronic GERD   • Chronic gout   • Benign prostatic hyperplasia without lower urinary tract symptoms   • S/P knee surgery   • Sepsis due to pneumonia        Past Medical History:   Diagnosis Date   • Achilles tendinitis    • Acute atopic conjunctivitis    • Astigmatism     refractive change     • Atopic conjunctivitis    • Borderline glaucoma    • Calcaneal spur    • Cardiomyopathy    • Carpal tunnel syndrome    • Conjunctivitis     allergic   • Coronary arteriosclerosis    • Cortical senile cataract     mild   • Degenerative joint disease     involving multiple joints    • Disease of gallbladder      s/p open jose for gangrenous cholecystitis   • Diverticular disease of colon      s/p hartmans and now take down colostomy      • Essential hypertension    • Foot pain     history of stress fracture right foot      • GERD (gastroesophageal reflux disease)    • Gouty arthropathy    • Hearing loss     wears hearing aids      • Heel pain     posterior   • History of colonic polyps    • History of echocardiogram 2014    Echocardiogram W/ color flow 11331 (Mild [facility]. Decreased LV systolic function with EF of 35%. Mitral and AV mildly thickened.Aortic sclerosis.Tricuspid intact.)   • History of echocardiogram 2010    Echocadiogram W/O color flow 06912 (Global hypokinesis with EF 35%. Left atrium appears to be dilated. Aortic root normal. Early diastolic dysfunctin. Mild mitral & tricuspid  regurgitation. Mild pulmonic egurgitation. RV pressure approx. 30-35 mmHg.)   • Hyperlipidemia    • Incisional hernia     s/p repair with mesh    • Keratoconjunctivitis sicca    • Myopia    • Onychogryposis    • Onycholysis    • Onychomycosis    • Pain in toe    • Plantar fasciitis     chronic   • Presbyopia    • Sleep apnea    • Vitreous detachment     history of posterior right eye      • Vitreous floaters     history of right eye        Past Surgical History:   Procedure Laterality Date   • CARDIAC CATHETERIZATION  05/22/2015    Cardiac cath 22717 (Negative FFR evaluation with a value 0.90 which was not hemdynamically significant with symptoms of chest discomfort.)   • CARDIAC CATHETERIZATION  07/09/2010    Cardiac cath 64622 (Small caliber 3rd obtuse marginal branch which was subtotally occluded and filling in through left-to-left collateral. Proximal LAD with 30% stenosis. lst diagonal branch with 40-50% stenosis with evidence of good BOBY 3 flow. Diffusely diseased RCA)   • CARPAL TUNNEL RELEASE  11/21/2003    Carpal tunnel surgery (Bilateral carpal tunnel release, endoscopic.)   • CHOLECYSTECTOMY     • COLONOSCOPY  12/02/2015    Colonoscopy remove polyps 05701 (A single polyp was found in the ascending and the transverse colon;removed by hot biopsy polypectomy.)   • COLONOSCOPY  12/03/2014    Colonoscopy remove polyps 79897 (A single polyp was found in the cecum. Removed by hot biopsy polypectomy. Single polyp in the transverse colon.Removed by hot biopsy polypectomy.Diverticulosis found in the desc.colon.Rectal stump and distal sig.)   • COLOSTOMY  12/04/2014    Descending colostomy takedown with primary anastomosis.   • CYSTOSCOPY  12/04/2014    Cystoscopy (With bilateral retrogrades. Right double J stent placement. Ureteroscopy on left.)   • EXPLORATORY LAPAROTOMY  09/03/2014    sigmoid colectomy with descending colostomy or Fitz's procedure. Incidental appendectomy.   • JOINT REPLACEMENT Left  11/18/2016   • JOINT REPLACEMENT Right Jannuary 16, 2017    Knee   • KNEE ARTHROSCOPY  07/17/1997    Knee arthroscopy, surgery (Medial meniscectomy. Arthritis, medial compartment; torn medial meniscus.)   • OTHER SURGICAL HISTORY  01/22/2014   • OTHER SURGICAL HISTORY      DEBRIDE NAIL 6 OR MORE 16735 (Onychogryposis, Pain in toe, Onychomycosis) (6): 04/24/2015, 01/25/2015, 10/21/2014, 04/22/2014, 01/22/2014   • OTHER SURGICAL HISTORY  04/29/2014    EXTENDED VISUAL FIELDS STUDY 09670 (Borderline glaucoma)    • OTHER SURGICAL HISTORY      OCT DISC NFL 19561 (Borderline glaucoma) (2): 05/06/2015, 04/24/2013             PT Ortho       03/24/17 1215    Precautions and Contraindications    Precautions TKA R 1/16/17; L 11/18/16  -    Contraindications protocol inchart  -    Posture/Observations    Posture/Observations Comments good cadance with gait/ No AD  -    General LE Assessment    ROM Detail R Knee AROM 0-122; L Knee AROM 0-117  -      03/22/17 1107    Precautions and Contraindications    Precautions TKA R 1/16/17; L 11/18/16  -    Contraindications Protocol in chart  -    Posture/Observations    Posture/Observations Comments No assistive device with improved heel-toe pattern b this date. Good patella mobility this date.   -    General LE Assessment    ROM Detail B Knee AROM 0-115  -    MMT (Manual Muscle Testing)    General MMT Assessment Detail 5 degree quad lag with SLR, improved with VC  -      User Key  (r) = Recorded By, (t) = Taken By, (c) = Cosigned By    Initials Name Provider Type    DANETTE Francis PTA Physical Therapy Assistant                            PT Assessment/Plan       03/24/17 1215       PT Assessment    Assessment Comments Improved AROM this date.  Did well with standing WB exercises.    -     PT Plan    PT Frequency 2x/week  -     Predicted Duration of Therapy Intervention (days/wks) 3 weeks  -     PT Plan Comments Progress strength as able,  Increase pro ll alexaie to  "10 min next.  lateral step ups/TKE's  -       User Key  (r) = Recorded By, (t) = Taken By, (c) = Cosigned By    Initials Name Provider Type    DANETTE Francis PTA Physical Therapy Assistant                    Exercises       03/24/17 1215          Subjective Comments    Subjective Comments Pt reports that he is blessed.  Knees doing realy well.  Was a little sore affter last treatment but not to bad.  reports no pain at the present  -      Subjective Pain    Able to rate subjective pain? yes  -      Pre-Treatment Pain Level 0  -KH      Post-Treatment Pain Level 0  -KH      Exercise 1    Exercise Name 1 Pro ll level 2  -KH      Time (Minutes) 1 8 minutes  -KH      Exercise 2    Exercise Name 2 incline stretch  -KH      Sets 2 3  -KH      Time (Seconds) 2 30\"  -KH      Exercise 3    Exercise Name 3 CR/TR  -KH      Sets 3 2  -KH      Reps 3 10  -KH      Exercise 4    Exercise Name 4 mini squats  -KH      Sets 4 2  -KH      Reps 4 10  -KH      Exercise 5    Exercise Name 5 step ups fwd B  -KH      Sets 5 2  -KH      Reps 5 10  -KH      Time (Seconds) 5 4 inch  -KH      Exercise 6    Exercise Name 6 LAQ B   -KH      Weights/Plates 6 1  -KH      Sets 6 2  -KH      Reps 6 10  -KH      Exercise 7    Exercise Name 7 seated heel slides  -KH      Time (Minutes) 7 3' each  -        User Key  (r) = Recorded By, (t) = Taken By, (c) = Cosigned By    Initials Name Provider Type    DANETTE Francis PTA Physical Therapy Assistant                               PT OP Goals       03/24/17 1215       PT Short Term Goals    STG Date to Achieve 04/12/17  -     STG 1 Independent in HEP   -     STG 1 Progress Ongoing  -     STG 2 No lag with SLR bilaterally  -     STG 2 Progress Progressing  -     STG 3 Bilateral knee ROM of 120 degrees or better  -     STG 3 Progress Progressing  -     STG 4 SLS bilaterally 5 seconds on level ground  -     STG 4 Progress New  -     STG 5 Complete 6' walk test with no increased pain or " antalgics  -     STG 5 Progress New  -     Time Calculation    PT Goal Re-Cert Due Date 04/05/17  -DANETTE       User Key  (r) = Recorded By, (t) = Taken By, (c) = Cosigned By    Initials Name Provider Type    DANETTE Francis PTA Physical Therapy Assistant                    Time Calculation:   Start Time: 1215  Stop Time: 1300  Time Calculation (min): 45 min  Total Timed Code Minutes- PT: 45 minute(s)    Therapy Charges for Today     Code Description Service Date Service Provider Modifiers Qty    84937289907 HC PT THER PROC EA 15 MIN 3/24/2017 Peg Francis PTA GP 3                    Peg Francis PTA  3/24/2017

## 2017-03-29 ENCOUNTER — HOSPITAL ENCOUNTER (OUTPATIENT)
Dept: PHYSICAL THERAPY | Facility: HOSPITAL | Age: 77
Setting detail: THERAPIES SERIES
Discharge: HOME | End: 2017-03-29

## 2017-03-29 DIAGNOSIS — Z96.653 STATUS POST TOTAL BILATERAL KNEE REPLACEMENT: Primary | ICD-10-CM

## 2017-03-29 PROCEDURE — 97110 THERAPEUTIC EXERCISES: CPT

## 2017-03-29 NOTE — PROGRESS NOTES
Outpatient Physical Therapy Ortho Treatment Note  [facility]     Patient Name: Raleigh Sanz  : 1940  MRN: [id number]  Today's Date: 3/29/2017      Visit Date: 2017     Subjective Improvement: 50%   Visits Attended: 4/4   Visits Approved medicare   MD visit: 6 months?   Recert Date: 17           Visit Dx:    ICD-10-CM ICD-9-CM   1. Status post total bilateral knee replacement Z96.653 V43.65       Patient Active Problem List   Diagnosis   • Calcaneal spur   • Bronchitis   • SAMANTA on CPAP   • Chronic GERD   • Chronic gout   • Benign prostatic hyperplasia without lower urinary tract symptoms   • S/P knee surgery   • Sepsis due to pneumonia        Past Medical History:   Diagnosis Date   • Achilles tendinitis    • Acute atopic conjunctivitis    • Astigmatism     refractive change     • Atopic conjunctivitis    • Borderline glaucoma    • Calcaneal spur    • Cardiomyopathy    • Carpal tunnel syndrome    • Conjunctivitis     allergic   • Coronary arteriosclerosis    • Cortical senile cataract     mild   • Degenerative joint disease     involving multiple joints    • Disease of gallbladder      s/p open jose for gangrenous cholecystitis   • Diverticular disease of colon      s/p hartmans and now take down colostomy      • Essential hypertension    • Foot pain     history of stress fracture right foot      • GERD (gastroesophageal reflux disease)    • Gouty arthropathy    • Hearing loss     wears hearing aids      • Heel pain     posterior   • History of colonic polyps    • History of echocardiogram 2014    Echocardiogram W/ color flow 45056 (Mild [facility]. Decreased LV systolic function with EF of 35%. Mitral and AV mildly thickened.Aortic sclerosis.Tricuspid intact.)   • History of echocardiogram 2010    Echocadiogram W/O color flow 18111 (Global hypokinesis with EF 35%. Left atrium appears to be dilated. Aortic root normal. Early diastolic dysfunctin. Mild mitral & tricuspid  regurgitation. Mild pulmonic egurgitation. RV pressure approx. 30-35 mmHg.)   • Hyperlipidemia    • Incisional hernia     s/p repair with mesh    • Keratoconjunctivitis sicca    • Myopia    • Onychogryposis    • Onycholysis    • Onychomycosis    • Pain in toe    • Plantar fasciitis     chronic   • Presbyopia    • Sleep apnea    • Vitreous detachment     history of posterior right eye      • Vitreous floaters     history of right eye        Past Surgical History:   Procedure Laterality Date   • CARDIAC CATHETERIZATION  05/22/2015    Cardiac cath 59835 (Negative FFR evaluation with a value 0.90 which was not hemdynamically significant with symptoms of chest discomfort.)   • CARDIAC CATHETERIZATION  07/09/2010    Cardiac cath 06576 (Small caliber 3rd obtuse marginal branch which was subtotally occluded and filling in through left-to-left collateral. Proximal LAD with 30% stenosis. lst diagonal branch with 40-50% stenosis with evidence of good BOBY 3 flow. Diffusely diseased RCA)   • CARPAL TUNNEL RELEASE  11/21/2003    Carpal tunnel surgery (Bilateral carpal tunnel release, endoscopic.)   • CHOLECYSTECTOMY     • COLONOSCOPY  12/02/2015    Colonoscopy remove polyps 84415 (A single polyp was found in the ascending and the transverse colon;removed by hot biopsy polypectomy.)   • COLONOSCOPY  12/03/2014    Colonoscopy remove polyps 55612 (A single polyp was found in the cecum. Removed by hot biopsy polypectomy. Single polyp in the transverse colon.Removed by hot biopsy polypectomy.Diverticulosis found in the desc.colon.Rectal stump and distal sig.)   • COLOSTOMY  12/04/2014    Descending colostomy takedown with primary anastomosis.   • CYSTOSCOPY  12/04/2014    Cystoscopy (With bilateral retrogrades. Right double J stent placement. Ureteroscopy on left.)   • EXPLORATORY LAPAROTOMY  09/03/2014    sigmoid colectomy with descending colostomy or Fitz's procedure. Incidental appendectomy.   • JOINT REPLACEMENT Left  11/18/2016   • JOINT REPLACEMENT Right Jannuary 16, 2017    Knee   • KNEE ARTHROSCOPY  07/17/1997    Knee arthroscopy, surgery (Medial meniscectomy. Arthritis, medial compartment; torn medial meniscus.)   • OTHER SURGICAL HISTORY  01/22/2014   • OTHER SURGICAL HISTORY      DEBRIDE NAIL 6 OR MORE 24369 (Onychogryposis, Pain in toe, Onychomycosis) (6): 04/24/2015, 01/25/2015, 10/21/2014, 04/22/2014, 01/22/2014   • OTHER SURGICAL HISTORY  04/29/2014    EXTENDED VISUAL FIELDS STUDY 00057 (Borderline glaucoma)    • OTHER SURGICAL HISTORY      OCT DISC NFL 73541 (Borderline glaucoma) (2): 05/06/2015, 04/24/2013             PT Ortho       03/29/17 0845    Subjective Comments    Subjective Comments Reports that the MD was pleased with his progress.  Just wants to start walking again.   -    Precautions and Contraindications    Precautions R TKA 1/16/17; L TKA 11/18/16  -    Contraindications protocol in chart  -    Posture/Observations    Posture/Observations Comments No AD; good candance  -    General LE Assessment    ROM Detail B AROM 0-122  -      User Key  (r) = Recorded By, (t) = Taken By, (c) = Cosigned By    Initials Name Provider Type    DANETTE Francis PTA Physical Therapy Assistant                            PT Assessment/Plan       03/29/17 0845       PT Assessment    Assessment Comments Continues to progress as normal.  Good AROM this date. No lag with straight leg raise this date B.    -     PT Plan    PT Frequency 2x/week  -     Predicted Duration of Therapy Intervention (days/wks) 3 weeks  -     PT Plan Comments One time next week then d/c; walking track next  -       User Key  (r) = Recorded By, (t) = Taken By, (c) = Cosigned By    Initials Name Provider Type    DANETTE Francis PTA Physical Therapy Assistant                    Exercises       03/29/17 0845          Subjective Comments    Subjective Comments Reports that the MD was pleased with his progress.  Just wants to start walking again.  "  -KH      Subjective Pain    Able to rate subjective pain? yes  -KH      Pre-Treatment Pain Level 0  -KH      Post-Treatment Pain Level 0  -KH      Exercise 1    Exercise Name 1 pro ll level 2  -KH      Time (Minutes) 1 10'   -KH      Exercise 2    Exercise Name 2 incline stretch  -KH      Sets 2 3  -KH      Time (Seconds) 2 30\"  -KH      Exercise 3    Exercise Name 3 lunge flex stretch B  -KH      Sets 3 1  -KH      Reps 3 15  -KH      Exercise 4    Exercise Name 4 step ups fwd B  -KH      Sets 4 1  -KH      Reps 4 25  -KH      Exercise 5    Exercise Name 5 cybex leg press sled 3  -KH      Sets 5 2  -KH      Reps 5 10  -KH      Time (Minutes) 5 60#  -KH      Exercise 6    Exercise Name 6 cybex hip abd/add  -KH      Sets 6 2  -KH      Reps 6 10  -KH      Time (Minutes) 6 30#  -KH      Exercise 7    Exercise Name 7 ramp fwd  -KH      Sets 7 5  -KH      Exercise 8    Exercise Name 8 ramp up backwards/down fwd  -KH      Sets 8 5  -KH        User Key  (r) = Recorded By, (t) = Taken By, (c) = Cosigned By    Initials Name Provider Type    DANETTE Francis PTA Physical Therapy Assistant                               PT OP Goals       03/29/17 0845       PT Short Term Goals    STG Date to Achieve 04/12/17  -KH     STG 1 Independent in HEP   -KH     STG 1 Progress Ongoing  -KH     STG 2 No lag with SLR bilaterally  -     STG 2 Progress Met  -KH     STG 3 Bilateral knee ROM of 120 degrees or better  -     STG 3 Progress Met  -KH     STG 4 SLS bilaterally 5 seconds on level ground  -     STG 4 Progress New  -     STG 5 Complete 6' walk test with no increased pain or antalgics  -     STG 5 Progress New  -KH     Time Calculation    PT Goal Re-Cert Due Date 04/05/17  -       User Key  (r) = Recorded By, (t) = Taken By, (c) = Cosigned By    Initials Name Provider Type    DANETTE Francis PTA Physical Therapy Assistant                    Time Calculation:   Start Time: 0845  Stop Time: 0925  Time Calculation (min): 40 " min  Total Timed Code Minutes- PT: 40 minute(s)    Therapy Charges for Today     Code Description Service Date Service Provider Modifiers Qty    43527757717 HC PT THER PROC EA 15 MIN 3/29/2017 Peg Francis PTA GP 3                    Peg Francis PTA  3/29/2017

## 2017-03-31 ENCOUNTER — HOSPITAL ENCOUNTER (OUTPATIENT)
Dept: PHYSICAL THERAPY | Facility: HOSPITAL | Age: 77
Setting detail: THERAPIES SERIES
Discharge: HOME | End: 2017-03-31

## 2017-03-31 DIAGNOSIS — Z96.653 STATUS POST TOTAL BILATERAL KNEE REPLACEMENT: Primary | ICD-10-CM

## 2017-03-31 PROCEDURE — 97110 THERAPEUTIC EXERCISES: CPT

## 2017-04-05 ENCOUNTER — HOSPITAL ENCOUNTER (OUTPATIENT)
Dept: PHYSICAL THERAPY | Facility: HOSPITAL | Age: 77
Setting detail: THERAPIES SERIES
Discharge: HOME | End: 2017-04-05

## 2017-04-05 DIAGNOSIS — Z96.653 STATUS POST TOTAL BILATERAL KNEE REPLACEMENT: Primary | ICD-10-CM

## 2017-04-05 PROCEDURE — G8979 MOBILITY GOAL STATUS: HCPCS | Performed by: PHYSICAL THERAPIST

## 2017-04-05 PROCEDURE — G8980 MOBILITY D/C STATUS: HCPCS | Performed by: PHYSICAL THERAPIST

## 2017-04-05 PROCEDURE — 97110 THERAPEUTIC EXERCISES: CPT | Performed by: PHYSICAL THERAPIST

## 2017-04-05 NOTE — THERAPY DISCHARGE NOTE
Outpatient Physical Therapy Ortho Treatment Note/Discharge Summary  [facility]     Patient Name: Raleigh Sanz  : 1940  MRN: [id number]  Today's Date: 2017      Visit Date: 2017     Pt attended 6/6 scheduled visits.   Pt feels has improved 95% since beginning therapy.  Pt will RTD 4 months, July    Visit Dx:    ICD-10-CM ICD-9-CM   1. Status post total bilateral knee replacement Z96.653 V43.65       Patient Active Problem List   Diagnosis   • Calcaneal spur   • Bronchitis   • SAMANTA on CPAP   • Chronic GERD   • Chronic gout   • Benign prostatic hyperplasia without lower urinary tract symptoms   • S/P knee surgery   • Sepsis due to pneumonia        Past Medical History:   Diagnosis Date   • Achilles tendinitis    • Acute atopic conjunctivitis    • Astigmatism     refractive change     • Atopic conjunctivitis    • Borderline glaucoma    • Calcaneal spur    • Cardiomyopathy    • Carpal tunnel syndrome    • Conjunctivitis     allergic   • Coronary arteriosclerosis    • Cortical senile cataract     mild   • Degenerative joint disease     involving multiple joints    • Disease of gallbladder      s/p open jose for gangrenous cholecystitis   • Diverticular disease of colon      s/p hartmans and now take down colostomy      • Essential hypertension    • Foot pain     history of stress fracture right foot      • GERD (gastroesophageal reflux disease)    • Gouty arthropathy    • Hearing loss     wears hearing aids      • Heel pain     posterior   • History of colonic polyps    • History of echocardiogram 2014    Echocardiogram W/ color flow 19496 (Mild [facility]. Decreased LV systolic function with EF of 35%. Mitral and AV mildly thickened.Aortic sclerosis.Tricuspid intact.)   • History of echocardiogram 2010    Echocadiogram W/O color flow 61240 (Global hypokinesis with EF 35%. Left atrium appears to be dilated. Aortic root normal. Early diastolic dysfunctin. Mild mitral & tricuspid  regurgitation. Mild pulmonic egurgitation. RV pressure approx. 30-35 mmHg.)   • Hyperlipidemia    • Incisional hernia     s/p repair with mesh    • Keratoconjunctivitis sicca    • Myopia    • Onychogryposis    • Onycholysis    • Onychomycosis    • Pain in toe    • Plantar fasciitis     chronic   • Presbyopia    • Sleep apnea    • Vitreous detachment     history of posterior right eye      • Vitreous floaters     history of right eye        Past Surgical History:   Procedure Laterality Date   • CARDIAC CATHETERIZATION  05/22/2015    Cardiac cath 90520 (Negative FFR evaluation with a value 0.90 which was not hemdynamically significant with symptoms of chest discomfort.)   • CARDIAC CATHETERIZATION  07/09/2010    Cardiac cath 64452 (Small caliber 3rd obtuse marginal branch which was subtotally occluded and filling in through left-to-left collateral. Proximal LAD with 30% stenosis. lst diagonal branch with 40-50% stenosis with evidence of good BOBY 3 flow. Diffusely diseased RCA)   • CARPAL TUNNEL RELEASE  11/21/2003    Carpal tunnel surgery (Bilateral carpal tunnel release, endoscopic.)   • CHOLECYSTECTOMY     • COLONOSCOPY  12/02/2015    Colonoscopy remove polyps 73197 (A single polyp was found in the ascending and the transverse colon;removed by hot biopsy polypectomy.)   • COLONOSCOPY  12/03/2014    Colonoscopy remove polyps 05770 (A single polyp was found in the cecum. Removed by hot biopsy polypectomy. Single polyp in the transverse colon.Removed by hot biopsy polypectomy.Diverticulosis found in the desc.colon.Rectal stump and distal sig.)   • COLOSTOMY  12/04/2014    Descending colostomy takedown with primary anastomosis.   • CYSTOSCOPY  12/04/2014    Cystoscopy (With bilateral retrogrades. Right double J stent placement. Ureteroscopy on left.)   • EXPLORATORY LAPAROTOMY  09/03/2014    sigmoid colectomy with descending colostomy or Fitz's procedure. Incidental appendectomy.   • JOINT REPLACEMENT Left  11/18/2016   • JOINT REPLACEMENT Right Jannuary 16, 2017    Knee   • KNEE ARTHROSCOPY  07/17/1997    Knee arthroscopy, surgery (Medial meniscectomy. Arthritis, medial compartment; torn medial meniscus.)   • OTHER SURGICAL HISTORY  01/22/2014   • OTHER SURGICAL HISTORY      DEBRIDE NAIL 6 OR MORE 50653 (Onychogryposis, Pain in toe, Onychomycosis) (6): 04/24/2015, 01/25/2015, 10/21/2014, 04/22/2014, 01/22/2014   • OTHER SURGICAL HISTORY  04/29/2014    EXTENDED VISUAL FIELDS STUDY 19641 (Borderline glaucoma)    • OTHER SURGICAL HISTORY      OCT DISC NFL 66508 (Borderline glaucoma) (2): 05/06/2015, 04/24/2013             PT Ortho       04/05/17 1500    Subjective Comments    Subjective Comments States feels good, resumed hobbies adn mobility. Denies limitations other than trouble putting on socks on occasion, sleeping well. States is continuing HEP. Relates will continue program here at the gym .   -LF    Subjective Pain    Able to rate subjective pain? yes  -LF    Pre-Treatment Pain Level 0  -LF    ROM (Range of Motion)    General ROM Detail B knee ROM WFL, no c/o   -LF    Transfers    Transfers, Sit-Stand [place] independent  -LF    Transfers, Stand-Sit [place] independent  -LF    Gait Assessment/Treatment    Gait, [place] Level independent  -LF    Gait, Comment no deviation  -LF      User Key  (r) = Recorded By, (t) = Taken By, (c) = Cosigned By    Initials Name Provider Type    LILIBETH Denise, PT Physical Therapist                            PT Assessment/Plan       04/05/17 1500       PT Assessment    Assessment Comments Nice progression, denies limitations with ADLs, mobility, resumed prior activites. I in HEP .   -LF     PT Plan    PT Plan Comments Pt appropriate for D/C to HEP. Pt will F/U as needed. Will RTD in 4 months, July.   -LF       User Key  (r) = Recorded By, (t) = Taken By, (c) = Cosigned By    Initials Name Provider Type    LILIBETH Denise PT Physical Therapist                     Exercises       04/05/17 1500          Subjective Comments    Subjective Comments States feels good, resumed hobbies adn mobility. Denies limitations other than trouble putting on socks on occasion, sleeping well. States is continuing HEP. Relates will continue program here at the gym .   -LF      Subjective Pain    Able to rate subjective pain? yes  -LF      Pre-Treatment Pain Level 0  -LF      Exercise 1    Exercise Name 1 Pro II, L 2  -LF      Time (Minutes) 1 10  -LF        User Key  (r) = Recorded By, (t) = Taken By, (c) = Cosigned By    Initials Name Provider Type    LILIBETH Denise, PT Physical Therapist                               PT OP Goals       04/05/17 1500       PT Short Term Goals    STG Date to Achieve 04/12/17  -LF     STG 1 Independent in HEP   -LF     STG 1 Progress Ongoing  -LF     STG 2 No lag with SLR bilaterally  -LF     STG 2 Progress Met  -LF     STG 3 Bilateral knee ROM of 120 degrees or better  -LF     STG 3 Progress Met  -LF     STG 4 SLS bilaterally 5 seconds on level ground  -LF     STG 4 Progress New  -LF     STG 5 Complete 6' walk test with no increased pain or antalgics  -LF     STG 5 Progress Met  -LF     Time Calculation    PT Goal Re-Cert Due Date 04/05/17  -LF       User Key  (r) = Recorded By, (t) = Taken By, (c) = Cosigned By    Initials Name Provider Type    LILIBETH Denise PT Physical Therapist                Therapy Education       04/05/17 1500          Therapy Education    Given HEP  -LF      Program Reinforced  -LF      How Provided Verbal  -LF      Provided to Patient  -LF      Level of Understanding Verbalized  -LF        User Key  (r) = Recorded By, (t) = Taken By, (c) = Cosigned By    Initials Name Provider Type    LILIBETH Denise PT Physical Therapist                Outcome Measures       04/05/17 1500          Lower Extremity Functional Index    Any of your usual work, housework or school activities 4  -LF      Your usual hobbies, recreational or sporting  activities 4  -LF      Getting into or out of the bath 4  -LF      Walking between rooms 4  -LF      Putting on your shoes or socks 3  -LF      Squatting 3  -LF      Lifting an object, like a bag of groceries from the floor 4  -LF      Performing light activities around your home 4  -LF      Performing heavy activities around your home 4  -LF      Getting into or out of a car 4  -LF      Walking 2 blocks 4  -LF      Walking a mile 3  -LF      Going up or down 10 stairs (about 1 flight of stairs) 4  -LF      Standing for 1 hour 3  -LF      Sitting for 1 hour 4  -LF      Running on even ground 0  -LF      Running on uneven ground 0  -LF      Making sharp turns while running fast 0  -LF      Hopping 0  -LF      Rolling over in bed 4  -LF      Total 60  -LF      Functional Assessment    Outcome Measure Options Lower Extremity Functional Scale (LEFS)  -LF        User Key  (r) = Recorded By, (t) = Taken By, (c) = Cosigned By    Initials Name Provider Type    LF Charlotte Denise, PT Physical Therapist            Time Calculation:   Start Time: 1450  Stop Time: 1515  Time Calculation (min): 25 min  Total Timed Code Minutes- PT: 25 minute(s)    Therapy Charges for Today     Code Description Service Date Service Provider Modifiers Qty    45032451151 HC PT MOBILITY PROJECTED 4/5/2017 Charlotte Denise, PT GP, CI 1    49763236009 HC PT MOBILITY DISCHARGE 4/5/2017 Charlotte Denise, PT GP, CI 1    69431085646 HC PT THER PROC EA 15 MIN 4/5/2017 Charlotte Denise, PT GP 2          PT G-Codes  PT Professional Judgement Used?: Yes  Outcome Measure Options: Lower Extremity Functional Scale (LEFS)  Score: 60  Functional Limitation: Mobility: Walking and moving around  Mobility: Walking and Moving Around Goal Status (): At least 1 percent but less than 20 percent impaired, limited or restricted  Mobility: Walking and Moving Around Discharge Status (): At least 1 percent but less than 20 percent impaired, limited or restricted     OP PT  Discharge Summary  Date of Discharge: 04/05/17  Reason for Discharge: All goals achieved  Outcomes Achieved: Able to achieve all goals within established timeline  Discharge Destination: Home with home program      Charlotte Denise, PT  4/5/2017

## 2017-05-25 ENCOUNTER — OFFICE VISIT (OUTPATIENT)
Dept: OPHTHALMOLOGY | Facility: CLINIC | Age: 77
End: 2017-05-25

## 2017-05-25 DIAGNOSIS — M35.01 KERATITIS SICCA, BILATERAL (HCC): ICD-10-CM

## 2017-05-25 DIAGNOSIS — H40.002 BORDERLINE GLAUCOMA, LEFT: Primary | ICD-10-CM

## 2017-05-25 DIAGNOSIS — H25.013 CORTICAL AGE-RELATED CATARACT, BILATERAL: ICD-10-CM

## 2017-05-25 PROBLEM — H40.009 BORDERLINE GLAUCOMA: Status: ACTIVE | Noted: 2017-05-25

## 2017-05-25 PROBLEM — H16.223 KERATITIS SICCA, BILATERAL: Status: ACTIVE | Noted: 2017-05-25

## 2017-05-25 PROBLEM — H25.019 CORTICAL AGE-RELATED CATARACT: Status: ACTIVE | Noted: 2017-05-25

## 2017-05-25 PROCEDURE — 76514 ECHO EXAM OF EYE THICKNESS: CPT | Performed by: OPHTHALMOLOGY

## 2017-05-25 PROCEDURE — 99214 OFFICE O/P EST MOD 30 MIN: CPT | Performed by: OPHTHALMOLOGY

## 2017-05-25 PROCEDURE — 92133 CPTRZD OPH DX IMG PST SGM ON: CPT | Performed by: OPHTHALMOLOGY

## 2017-06-16 ENCOUNTER — PREP FOR SURGERY (OUTPATIENT)
Dept: OTHER | Facility: HOSPITAL | Age: 77
End: 2017-06-16

## 2017-06-16 ENCOUNTER — OFFICE VISIT (OUTPATIENT)
Dept: SURGERY | Facility: CLINIC | Age: 77
End: 2017-06-16

## 2017-06-16 VITALS
BODY MASS INDEX: 32.76 KG/M2 | DIASTOLIC BLOOD PRESSURE: 62 MMHG | SYSTOLIC BLOOD PRESSURE: 124 MMHG | HEIGHT: 71 IN | WEIGHT: 234 LBS

## 2017-06-16 DIAGNOSIS — K63.5 COLON POLYPS: Primary | ICD-10-CM

## 2017-06-16 DIAGNOSIS — Z86.010 HISTORY OF COLON POLYPS: Primary | ICD-10-CM

## 2017-06-16 PROCEDURE — 99213 OFFICE O/P EST LOW 20 MIN: CPT | Performed by: SURGERY

## 2017-06-16 RX ORDER — DEXTROSE AND SODIUM CHLORIDE 5; .45 G/100ML; G/100ML
100 INJECTION, SOLUTION INTRAVENOUS CONTINUOUS
Status: CANCELLED | OUTPATIENT
Start: 2017-06-16

## 2017-06-16 NOTE — PROGRESS NOTES
77-year-old gentleman well known to me as undergone a Fitz's procedure and subsequent colostomy takedown 3 years ago at the time of his colostomy takedown he underwent colonoscopy with findings of an adenomatous polyp in cecum.  He needs follow-up colonoscopy done at this point.  Denies any symptoms.  He's also had a gangrenous open cholecystectomy and epigastric incisional hernia repair since this other surgery.  No family history of colon cancer.  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     Social History Main Topics   • Smoking status: Former Smoker   • Smokeless tobacco: Not on file   • Alcohol use No   • Drug use: No   • Sexual activity: Not on file     Other Topics Concern   • Not on file     Social History Narrative       Past Medical History:   Diagnosis Date   • Achilles tendinitis    • Acute atopic conjunctivitis    • Astigmatism     refractive change     • Atopic conjunctivitis    • Borderline glaucoma    • Calcaneal spur    • Cardiomyopathy    • Carpal tunnel syndrome    • Conjunctivitis     allergic   • Coronary arteriosclerosis    • Cortical senile cataract     mild   • Degenerative joint disease     involving multiple joints    • Disease of gallbladder      s/p open jose for gangrenous cholecystitis   • Diverticular disease of colon      s/p hartmans and now take down colostomy      • Essential hypertension    • Foot pain     history of stress fracture right foot      • GERD (gastroesophageal reflux disease)    • Gouty arthropathy    • Hearing loss     wears hearing aids      • Heel pain     posterior   • History of colonic polyps    • History of echocardiogram 09/03/2014    Echocardiogram W/ color flow 03739 (Mild [facility]. Decreased LV systolic function with EF of 35%. Mitral and AV mildly thickened.Aortic sclerosis.Tricuspid  intact.)   • History of echocardiogram 02/26/2010    Echocadiogram W/O color flow 65665 (Global hypokinesis with EF 35%. Left atrium appears to be dilated. Aortic root normal. Early diastolic dysfunctin. Mild mitral & tricuspid regurgitation. Mild pulmonic egurgitation. RV pressure approx. 30-35 mmHg.)   • Hyperlipidemia    • Incisional hernia     s/p repair with mesh    • Keratoconjunctivitis sicca    • Myopia    • Onychogryposis    • Onycholysis    • Onychomycosis    • Pain in toe    • Plantar fasciitis     chronic   • Presbyopia    • Sleep apnea    • Vitreous detachment     history of posterior right eye      • Vitreous floaters     history of right eye       Family History   Problem Relation Age of Onset   • Cancer Other    • Diabetes Other    • Heart disease Other    • Hypertension Other    • Thyroid disease Other        Past Surgical History:   Procedure Laterality Date   • CARDIAC CATHETERIZATION  05/22/2015    Cardiac cath 57369 (Negative FFR evaluation with a value 0.90 which was not hemdynamically significant with symptoms of chest discomfort.)   • CARDIAC CATHETERIZATION  07/09/2010    Cardiac cath 75421 (Small caliber 3rd obtuse marginal branch which was subtotally occluded and filling in through left-to-left collateral. Proximal LAD with 30% stenosis. lst diagonal branch with 40-50% stenosis with evidence of good BOBY 3 flow. Diffusely diseased RCA)   • CARPAL TUNNEL RELEASE  11/21/2003    Carpal tunnel surgery (Bilateral carpal tunnel release, endoscopic.)   • CHOLECYSTECTOMY     • COLONOSCOPY  12/02/2015    Colonoscopy remove polyps 64437 (A single polyp was found in the ascending and the transverse colon;removed by hot biopsy polypectomy.)   • COLONOSCOPY  12/03/2014    Colonoscopy remove polyps 34690 (A single polyp was found in the cecum. Removed by hot biopsy polypectomy. Single polyp in the transverse colon.Removed by hot biopsy polypectomy.Diverticulosis found in the desc.colon.Rectal stump and  distal sig.)   • COLOSTOMY  12/04/2014    Descending colostomy takedown with primary anastomosis.   • CYSTOSCOPY  12/04/2014    Cystoscopy (With bilateral retrogrades. Right double J stent placement. Ureteroscopy on left.)   • EXPLORATORY LAPAROTOMY  09/03/2014    sigmoid colectomy with descending colostomy or Fitz's procedure. Incidental appendectomy.   • JOINT REPLACEMENT Left 11/18/2016   • JOINT REPLACEMENT Right Jannuary 16, 2017    Knee   • KNEE ARTHROSCOPY  07/17/1997    Knee arthroscopy, surgery (Medial meniscectomy. Arthritis, medial compartment; torn medial meniscus.)   • OTHER SURGICAL HISTORY  01/22/2014   • OTHER SURGICAL HISTORY      DEBRIDE NAIL 6 OR MORE 33971 (Onychogryposis, Pain in toe, Onychomycosis) (6): 04/24/2015, 01/25/2015, 10/21/2014, 04/22/2014, 01/22/2014   • OTHER SURGICAL HISTORY  04/29/2014    EXTENDED VISUAL FIELDS STUDY 46984 (Borderline glaucoma)    • OTHER SURGICAL HISTORY      OCT DISC NFL 81225 (Borderline glaucoma) (2): 05/06/2015, 04/24/2013       Alert and appropriate nonicteric nontoxic appearing  Lungs clear heart regular rate and rhythm  Abdomen soft incisions all healed well no palpable hernias appreciated  Extremities unremarkable  Skin warm and dry      Assessment and plan    History of adenomatous polyps recommend colonoscopy.  I fully discussed the procedure along with alternatives risk and benefits with the patient and he clearly understands and wishes to proceed.  Also discussed the prep with him myself.                                                    EMR Dragon/Transcription disclaimer:  Much of this encounter note is on electronic transcription/translation of spoken language to printed text.  The electronic translation of spoken language may permit erroneous or at times, nonsensical words or phrases to be inadvertently transcribed;  Although I have reviewed the note for such errors, some may still exist.

## 2017-06-28 ENCOUNTER — ANESTHESIA EVENT (OUTPATIENT)
Dept: GASTROENTEROLOGY | Facility: HOSPITAL | Age: 77
End: 2017-06-28

## 2017-06-28 ENCOUNTER — ANESTHESIA (OUTPATIENT)
Dept: GASTROENTEROLOGY | Facility: HOSPITAL | Age: 77
End: 2017-06-28

## 2017-06-28 ENCOUNTER — HOSPITAL ENCOUNTER (OUTPATIENT)
Facility: HOSPITAL | Age: 77
Setting detail: HOSPITAL OUTPATIENT SURGERY
Discharge: HOME | End: 2017-06-28
Attending: SURGERY | Admitting: SURGERY

## 2017-06-28 VITALS
TEMPERATURE: 97 F | OXYGEN SATURATION: 96 % | HEIGHT: 71 IN | BODY MASS INDEX: 31.64 KG/M2 | HEART RATE: 93 BPM | SYSTOLIC BLOOD PRESSURE: 99 MMHG | DIASTOLIC BLOOD PRESSURE: 53 MMHG | WEIGHT: 226 LBS | RESPIRATION RATE: 18 BRPM

## 2017-06-28 DIAGNOSIS — Z86.010 HISTORY OF COLON POLYPS: ICD-10-CM

## 2017-06-28 PROCEDURE — 25010000002 FENTANYL CITRATE (PF) 100 MCG/2ML SOLUTION: Performed by: NURSE ANESTHETIST, CERTIFIED REGISTERED

## 2017-06-28 PROCEDURE — 25010000002 AMPICILLIN PER 500 MG: Performed by: SURGERY

## 2017-06-28 PROCEDURE — G0105 COLORECTAL SCRN; HI RISK IND: HCPCS | Performed by: SURGERY

## 2017-06-28 PROCEDURE — 25010000002 MIDAZOLAM PER 1 MG: Performed by: NURSE ANESTHETIST, CERTIFIED REGISTERED

## 2017-06-28 PROCEDURE — 25010000002 PROPOFOL 10 MG/ML EMULSION: Performed by: NURSE ANESTHETIST, CERTIFIED REGISTERED

## 2017-06-28 RX ORDER — SODIUM CHLORIDE, SODIUM GLUCONATE, SODIUM ACETATE, POTASSIUM CHLORIDE, AND MAGNESIUM CHLORIDE 526; 502; 368; 37; 30 MG/100ML; MG/100ML; MG/100ML; MG/100ML; MG/100ML
INJECTION, SOLUTION INTRAVENOUS CONTINUOUS PRN
Status: DISCONTINUED | OUTPATIENT
Start: 2017-06-28 | End: 2017-06-28 | Stop reason: SURG

## 2017-06-28 RX ORDER — DEXTROSE AND SODIUM CHLORIDE 5; .45 G/100ML; G/100ML
100 INJECTION, SOLUTION INTRAVENOUS CONTINUOUS
Status: DISCONTINUED | OUTPATIENT
Start: 2017-06-28 | End: 2017-06-28 | Stop reason: HOSPADM

## 2017-06-28 RX ORDER — MIDAZOLAM HYDROCHLORIDE 1 MG/ML
INJECTION INTRAMUSCULAR; INTRAVENOUS AS NEEDED
Status: DISCONTINUED | OUTPATIENT
Start: 2017-06-28 | End: 2017-06-28 | Stop reason: SURG

## 2017-06-28 RX ORDER — FENTANYL CITRATE 50 UG/ML
INJECTION, SOLUTION INTRAMUSCULAR; INTRAVENOUS AS NEEDED
Status: DISCONTINUED | OUTPATIENT
Start: 2017-06-28 | End: 2017-06-28 | Stop reason: SURG

## 2017-06-28 RX ORDER — PROPOFOL 10 MG/ML
VIAL (ML) INTRAVENOUS AS NEEDED
Status: DISCONTINUED | OUTPATIENT
Start: 2017-06-28 | End: 2017-06-28 | Stop reason: SURG

## 2017-06-28 RX ADMIN — DEXTROSE AND SODIUM CHLORIDE 100 ML/HR: 5; 450 INJECTION, SOLUTION INTRAVENOUS at 08:57

## 2017-06-28 RX ADMIN — MIDAZOLAM 0.5 MG: 1 INJECTION INTRAMUSCULAR; INTRAVENOUS at 09:45

## 2017-06-28 RX ADMIN — FENTANYL CITRATE 25 MCG: 50 INJECTION, SOLUTION INTRAMUSCULAR; INTRAVENOUS at 09:55

## 2017-06-28 RX ADMIN — FENTANYL CITRATE 25 MCG: 50 INJECTION, SOLUTION INTRAMUSCULAR; INTRAVENOUS at 09:45

## 2017-06-28 RX ADMIN — PROPOFOL 30 MG: 10 INJECTION, EMULSION INTRAVENOUS at 09:55

## 2017-06-28 RX ADMIN — AMPICILLIN: 1 INJECTION, POWDER, FOR SOLUTION INTRAMUSCULAR; INTRAVENOUS at 09:43

## 2017-06-28 RX ADMIN — SODIUM CHLORIDE, SODIUM GLUCONATE, SODIUM ACETATE, POTASSIUM CHLORIDE, AND MAGNESIUM CHLORIDE: 526; 502; 368; 37; 30 INJECTION, SOLUTION INTRAVENOUS at 09:45

## 2017-06-28 RX ADMIN — MIDAZOLAM 0.5 MG: 1 INJECTION INTRAMUSCULAR; INTRAVENOUS at 09:55

## 2017-06-28 RX ADMIN — PROPOFOL 70 MG: 10 INJECTION, EMULSION INTRAVENOUS at 09:50

## 2017-06-28 NOTE — ANESTHESIA PREPROCEDURE EVALUATION
Anesthesia Evaluation            Airway   Mallampati: III  TM distance: <3 FB  Neck ROM: limited  possible difficult intubation  Dental    (+) lower dentures    Pulmonary - normal exam   (+) pneumonia , sleep apnea,   Cardiovascular - normal exam    (+) hypertension, CAD, hyperlipidemia      Neuro/Psych  GI/Hepatic/Renal/Endo    (+)  GERD,     Musculoskeletal     Abdominal  - normal exam   Substance History      OB/GYN          Other   (+) arthritis                                     Anesthesia Plan    ASA 3     MAC     intravenous induction   Anesthetic plan and risks discussed with patient.

## 2017-06-28 NOTE — ANESTHESIA POSTPROCEDURE EVALUATION
Patient: Raleigh Sanz    Procedure Summary     Date Anesthesia Start Anesthesia Stop Room / Location    06/28/17 0946 1013 [facility] ENDOSCOPY 2 / [facility] ENDOSCOPY       Procedure Diagnosis Surgeon Provider    COLONOSCOPY (N/A ) History of colon polyps  (History of colon polyps [Z86.010]) MD Julia Costello CRNA          Anesthesia Type: MAC  Last vitals  BP      Temp      Pulse     Resp      SpO2        Post Anesthesia Care and Evaluation    Patient location during evaluation: bedside  Patient participation: complete - patient participated  Level of consciousness: awake and alert  Pain management: adequate  Airway patency: patent  Anesthetic complications: No anesthetic complications    Cardiovascular status: acceptable  Respiratory status: acceptable  Hydration status: acceptable

## 2018-11-07 ENCOUNTER — OFFICE VISIT (OUTPATIENT)
Dept: PODIATRY | Facility: CLINIC | Age: 78
End: 2018-11-07

## 2018-11-07 VITALS
OXYGEN SATURATION: 95 % | DIASTOLIC BLOOD PRESSURE: 80 MMHG | BODY MASS INDEX: 32.62 KG/M2 | HEIGHT: 71 IN | SYSTOLIC BLOOD PRESSURE: 166 MMHG | HEART RATE: 68 BPM | WEIGHT: 233 LBS

## 2018-11-07 DIAGNOSIS — M10.9 ACUTE GOUT INVOLVING TOE OF RIGHT FOOT, UNSPECIFIED CAUSE: ICD-10-CM

## 2018-11-07 DIAGNOSIS — M79.674 GREAT TOE PAIN, RIGHT: Primary | ICD-10-CM

## 2018-11-07 LAB — CRYSTALS FLD MICRO: NORMAL

## 2018-11-07 PROCEDURE — 87070 CULTURE OTHR SPECIMN AEROBIC: CPT | Performed by: PODIATRIST

## 2018-11-07 PROCEDURE — 87015 SPECIMEN INFECT AGNT CONCNTJ: CPT | Performed by: PODIATRIST

## 2018-11-07 PROCEDURE — 99203 OFFICE O/P NEW LOW 30 MIN: CPT | Performed by: PODIATRIST

## 2018-11-07 PROCEDURE — 20600 DRAIN/INJ JOINT/BURSA W/O US: CPT | Performed by: PODIATRIST

## 2018-11-07 PROCEDURE — 89060 EXAM SYNOVIAL FLUID CRYSTALS: CPT | Performed by: PODIATRIST

## 2018-11-07 PROCEDURE — 87205 SMEAR GRAM STAIN: CPT | Performed by: PODIATRIST

## 2018-11-07 RX ORDER — DEXAMETHASONE SODIUM PHOSPHATE 4 MG/ML
4 INJECTION, SOLUTION INTRA-ARTICULAR; INTRALESIONAL; INTRAMUSCULAR; INTRAVENOUS; SOFT TISSUE ONCE
Status: COMPLETED | OUTPATIENT
Start: 2018-11-07 | End: 2018-11-07

## 2018-11-07 RX ADMIN — DEXAMETHASONE SODIUM PHOSPHATE 4 MG: 4 INJECTION, SOLUTION INTRA-ARTICULAR; INTRALESIONAL; INTRAMUSCULAR; INTRAVENOUS; SOFT TISSUE at 16:59

## 2018-11-07 NOTE — PROGRESS NOTES
Raleigh Sanz  1940  78 y.o. male     Patient presents today with complaint of painful, swollen right great toe. He states this started 4 days ago. He does have a history of gout.     11/07/2018    Chief Complaint   Patient presents with   • Right Foot - Pain, Edema       History of Present Illness    Raleigh Sanz is a 78 y.o.male who presents to clinic with a red hot swollen right great toe.  He states that it started 4 days ago.  He denies any injuries to the toe.  He states that he went to bed and it was normal and woke up and it was red hot and swollen.  He has been taking his gout medicine which is not helping.  He currently rates the pain as an 8 out of 10.  Pain is aggravated with pressure and motion of the great toe.  He has no other pedal complaints.      Past Medical History:   Diagnosis Date   • Achilles tendinitis    • Acute atopic conjunctivitis    • Astigmatism     refractive change     • Atopic conjunctivitis    • Borderline glaucoma    • Calcaneal spur    • Cardiomyopathy (CMS/HCC)    • Carpal tunnel syndrome    • Conjunctivitis     allergic   • Coronary arteriosclerosis    • Cortical senile cataract     mild   • Degenerative joint disease     involving multiple joints    • Disease of gallbladder      s/p open jose for gangrenous cholecystitis   • Diverticular disease of colon      s/p hartmans and now take down colostomy      • Essential hypertension    • Foot pain     history of stress fracture right foot      • GERD (gastroesophageal reflux disease)    • Gout    • Gouty arthropathy    • Hearing loss     wears hearing aids      • Heel pain     posterior   • History of colonic polyps    • History of echocardiogram 09/03/2014    Echocardiogram W/ color flow 41929 (Mild [facility]. Decreased LV systolic function with EF of 35%. Mitral and AV mildly thickened.Aortic sclerosis.Tricuspid intact.)   • History of echocardiogram 02/26/2010    Echocadiogram W/O color flow 04245 (Global  hypokinesis with EF 35%. Left atrium appears to be dilated. Aortic root normal. Early diastolic dysfunctin. Mild mitral & tricuspid regurgitation. Mild pulmonic egurgitation. RV pressure approx. 30-35 mmHg.)   • Hyperlipidemia    • Incisional hernia     s/p repair with mesh    • Ingrown toenail    • Keratoconjunctivitis sicca (CMS/HCC)    • Myopia    • Onychogryposis    • Onycholysis    • Onychomycosis    • Pain in toe    • Plantar fasciitis     chronic   • Presbyopia    • Sleep apnea    • Vitreous detachment     history of posterior right eye      • Vitreous floaters     history of right eye         Past Surgical History:   Procedure Laterality Date   • CARDIAC CATHETERIZATION  05/22/2015    Cardiac cath 36935 (Negative FFR evaluation with a value 0.90 which was not hemdynamically significant with symptoms of chest discomfort.)   • CARDIAC CATHETERIZATION  07/09/2010    Cardiac cath 07456 (Small caliber 3rd obtuse marginal branch which was subtotally occluded and filling in through left-to-left collateral. Proximal LAD with 30% stenosis. lst diagonal branch with 40-50% stenosis with evidence of good BOBY 3 flow. Diffusely diseased RCA)   • CARPAL TUNNEL RELEASE  11/21/2003    Carpal tunnel surgery (Bilateral carpal tunnel release, endoscopic.)   • CHOLECYSTECTOMY     • COLONOSCOPY  12/02/2015    Colonoscopy remove polyps 94408 (A single polyp was found in the ascending and the transverse colon;removed by hot biopsy polypectomy.)   • COLONOSCOPY  12/03/2014    Colonoscopy remove polyps 55879 (A single polyp was found in the cecum. Removed by hot biopsy polypectomy. Single polyp in the transverse colon.Removed by hot biopsy polypectomy.Diverticulosis found in the desc.colon.Rectal stump and distal sig.)   • COLONOSCOPY N/A 6/28/2017    Procedure: COLONOSCOPY;  Surgeon: Shady Andrew MD;  Location: [facility] ENDOSCOPY;  Service:    • COLOSTOMY  12/04/2014    Descending colostomy takedown with primary anastomosis.   •  CYSTOSCOPY  12/04/2014    Cystoscopy (With bilateral retrogrades. Right double J stent placement. Ureteroscopy on left.)   • EXPLORATORY LAPAROTOMY  09/03/2014    sigmoid colectomy with descending colostomy or Fitz's procedure. Incidental appendectomy.   • JOINT REPLACEMENT Left 11/18/2016   • JOINT REPLACEMENT Right Jannuary 16, 2017    Knee   • KNEE ARTHROSCOPY  07/17/1997    Knee arthroscopy, surgery (Medial meniscectomy. Arthritis, medial compartment; torn medial meniscus.)   • OTHER SURGICAL HISTORY  01/22/2014   • OTHER SURGICAL HISTORY      DEBRIDE NAIL 6 OR MORE 90582 (Onychogryposis, Pain in toe, Onychomycosis) (6): 04/24/2015, 01/25/2015, 10/21/2014, 04/22/2014, 01/22/2014   • OTHER SURGICAL HISTORY  04/29/2014    EXTENDED VISUAL FIELDS STUDY 39692 (Borderline glaucoma)    • OTHER SURGICAL HISTORY      OCT DISC NFL 01647 (Borderline glaucoma) (2): 05/06/2015, 04/24/2013         Family History   Problem Relation Age of Onset   • Cancer Other    • Diabetes Other    • Heart disease Other    • Hypertension Other    • Thyroid disease Other    • Cancer Mother    • Heart disease Mother    • Diabetes Mother    • Hypertension Mother    • Thyroid disease Mother    • Cancer Father    • Heart disease Father        Allergies   Allergen Reactions   • Latex Itching   • Lortab [Hydrocodone-Acetaminophen] Hives   • Tape      Plastic tape  Silk tape       Social History     Social History   • Marital status:      Spouse name: N/A   • Number of children: N/A   • Years of education: N/A     Occupational History   • Not on file.     Social History Main Topics   • Smoking status: Former Smoker   • Smokeless tobacco: Not on file   • Alcohol use No   • Drug use: No   • Sexual activity: Defer     Other Topics Concern   • Not on file     Social History Narrative   • No narrative on file         Current Outpatient Prescriptions   Medication Sig Dispense Refill   • acetaminophen (TYLENOL) 500 MG tablet Take 1,000 mg by  "mouth Every 6 (Six) Hours As Needed for mild pain (1-3).     • allopurinol (ZYLOPRIM) 300 MG tablet Take 300 mg by mouth Daily.     • aspirin 81 MG chewable tablet Chew 81 mg Every Other Day.     • carvedilol (COREG) 3.125 MG tablet Take 3.125 mg by mouth 2 (Two) Times a Day With Meals.     • pantoprazole (PROTONIX) 40 MG EC tablet Take 40 mg by mouth Daily.     • promethazine-dextromethorphan (PROMETHAZINE-DM) 6.25-15 MG/5ML syrup Take 5 mL by mouth At Night As Needed for Cough. 120 mL 0   • spironolactone (ALDACTONE) 25 MG tablet Take 25 mg by mouth Daily.     • tamsulosin (FLOMAX) 0.4 MG capsule 24 hr capsule Take 1 capsule by mouth Every Other Day.     • valsartan-hydrochlorothiazide (DIOVAN-HCT) 80-12.5 MG per tablet Take 1 tablet by mouth Daily.     • azithromycin (ZITHROMAX Z-AMELIA) 250 MG tablet Take 2 tablets the first day, then 1 tablet daily for 4 days. 6 tablet 0   • benzonatate (TESSALON) 200 MG capsule Take 1 capsule by mouth 3 (Three) Times a Day As Needed for Cough. 30 capsule 0   • doxycycline (VIBRAMYCIN) 100 MG capsule Take 1 capsule by mouth 2 (Two) Times a Day. 20 capsule 0   • magnesium oxide (MAGOX) 400 (241.3 MG) MG tablet tablet 400 mg Daily.  11     No current facility-administered medications for this visit.        Review of Systems   Constitutional: Negative.    HENT: Negative.    Eyes: Negative.    Respiratory: Negative.    Cardiovascular: Negative.    Gastrointestinal: Negative.    Musculoskeletal:        Foot pain    Neurological: Negative.    Psychiatric/Behavioral: Negative.          OBJECTIVE    /80   Pulse 68   Ht 181.6 cm (71.5\")   Wt 106 kg (233 lb)   SpO2 95%   BMI 32.05 kg/m²       Physical Exam   Constitutional: He is oriented to person, place, and time. He appears well-developed and well-nourished. No distress.   HENT:   Head: Normocephalic and atraumatic.   Nose: Nose normal.   Eyes: Pupils are equal, round, and reactive to light. Conjunctivae and EOM are normal. "   Pulmonary/Chest: Effort normal. No respiratory distress. He has no wheezes.   Neurological: He is alert and oriented to person, place, and time. He displays normal reflexes.   Skin: Skin is warm and dry. Capillary refill takes less than 2 seconds.   Psychiatric: He has a normal mood and affect. His behavior is normal.   Vitals reviewed.      Gait: Normal    Assistive Device: None    Right Lower Extremity    Cardiovascular:    DP/PT pulses palpable    CFT brisk  to all digits  Skin temp is warm to warm from proximal tibia to distal digits    Pedal hair growth present.   Significant erythema and edema noted to the right hallux.  Musculoskeletal:  Muscle strength is 5/5 for all muscle groups tested   ROM of the 1st MTP is guarded with significant pain  ROM of the ankle joint is full without pain or crepitus       Dermatological:   Skin is warm, dry and intact    Webspaces 1-4  are clean, dry and intact.   No subcutaneous nodules or masses noted    No open wounds noted     Neurological:   Protective sensation intact   Sensation intact to light touch        Small Joint Arthrocentesis  Consent given by: patient  Site marked: site marked  Timeout: Immediately prior to procedure a time out was called to verify the correct patient, procedure, equipment, support staff and site/side marked as required   Supporting Documentation  Indications: joint swelling and pain   Procedure Details  Location: great toe - R great IP  Needle gauge: 18G.  Approach: dorsal  Medication group details: 1 cc of Decadron 4 mg/mL.  Aspirate: cloudy and blood-tinged  Analysis: fluid sample sent for laboratory analysis  Patient tolerance: patient tolerated the procedure well with no immediate complications              ASSESSMENT AND PLAN    Raleigh was seen today for pain and edema.    Diagnoses and all orders for this visit:    Great toe pain, right  -     XR Foot 3+ View Right  -     dexamethasone (DECADRON) injection 4 mg; Infuse 1 mL into a venous  catheter 1 (One) Time.  -     Body Fluid Culture - Body Fluid, Toe, Right; Future  -     Body Fluid Cell Count With Differential - Body Fluid, Foot, Right; Future  -     Crystal Exam, Fluid - Synovial Fluid,; Future  -     Body Fluid Culture - Body Fluid, Toe, Right  -     Crystal Exam, Fluid - Synovial Fluid,  -     Cancel: Body Fluid Cell Count With Differential - Body Fluid, Foot, Right  -     Cancel: Body fluid cell count - Body Fluid,    Acute gout involving toe of right foot, unspecified cause    Other orders  -     doxycycline (VIBRAMYCIN) 100 MG capsule; Take 1 capsule by mouth 2 (Two) Times a Day.      - Comprehensive foot and ankle exam performed.   - X-rays taken and reviewed  - Right hallux interphalangeal joint aspiration performed.  - rx for doxycycline  - All questions were answered to the patients satisfaction.  - RTC 1 week          This document has been electronically signed by Tevin Rowley DPM on November 8, 2018 1:09 PM     11/8/2018  1:09 PM

## 2018-11-08 RX ORDER — DOXYCYCLINE HYCLATE 100 MG/1
100 CAPSULE ORAL 2 TIMES DAILY
Qty: 20 CAPSULE | Refills: 0 | Status: SHIPPED | OUTPATIENT
Start: 2018-11-08

## 2018-11-13 LAB
BACTERIA FLD CULT: NORMAL
GRAM STN SPEC: NORMAL
GRAM STN SPEC: NORMAL

## 2018-11-14 ENCOUNTER — OFFICE VISIT (OUTPATIENT)
Dept: PODIATRY | Facility: CLINIC | Age: 78
End: 2018-11-14

## 2018-11-14 VITALS — HEIGHT: 72 IN | WEIGHT: 233 LBS | OXYGEN SATURATION: 98 % | BODY MASS INDEX: 31.56 KG/M2 | HEART RATE: 66 BPM

## 2018-11-14 DIAGNOSIS — M10.9 ACUTE GOUT INVOLVING TOE OF RIGHT FOOT, UNSPECIFIED CAUSE: Primary | ICD-10-CM

## 2018-11-14 DIAGNOSIS — B35.1 ONYCHOMYCOSIS: ICD-10-CM

## 2018-11-14 PROCEDURE — 99213 OFFICE O/P EST LOW 20 MIN: CPT | Performed by: PODIATRIST

## 2018-11-14 PROCEDURE — 11755 BIOPSY NAIL UNIT: CPT | Performed by: PODIATRIST

## 2018-11-14 NOTE — PROGRESS NOTES
Raleigh Sanz  1940  78 y.o. male   Not diabetic    Patient presents today for recheck of his right great toe.    11/14/2018     Chief Complaint   Patient presents with   • Right Foot - Follow-up       History of Present Illness    Patient presents today for recheck of his right great toe.  On his last visit he had a joint tap with fluid sent to the lab for analysis.  He also received a steroid injection into his right great toe interphalangeal joint.  He states the pain is much improved.  He currently rates his pain as a 0 out of 10.  He relates that it is still a little red and swollen but much better than before.  He does complain today about discolored and thickened toenails.  Toenails have been getting progressively worse over the last couple of years.  He is concerned he may have a fungal infection.  He has tried over-the-counter antifungal medications which have not helped.  He is interested in discussing other treatment options.  He has no other pedal complaints.    Past Medical History:   Diagnosis Date   • Achilles tendinitis    • Acute atopic conjunctivitis    • Astigmatism     refractive change     • Atopic conjunctivitis    • Borderline glaucoma    • Calcaneal spur    • Cardiomyopathy (CMS/HCC)    • Carpal tunnel syndrome    • Conjunctivitis     allergic   • Coronary arteriosclerosis    • Cortical senile cataract     mild   • Degenerative joint disease     involving multiple joints    • Disease of gallbladder      s/p open jose for gangrenous cholecystitis   • Diverticular disease of colon      s/p hartmans and now take down colostomy      • Essential hypertension    • Foot pain     history of stress fracture right foot      • GERD (gastroesophageal reflux disease)    • Gout    • Gouty arthropathy    • Hearing loss     wears hearing aids      • Heel pain     posterior   • History of colonic polyps    • History of echocardiogram 09/03/2014    Echocardiogram W/ color flow 34789 (Mild [facility].  Decreased LV systolic function with EF of 35%. Mitral and AV mildly thickened.Aortic sclerosis.Tricuspid intact.)   • History of echocardiogram 02/26/2010    Echocadiogram W/O color flow 11540 (Global hypokinesis with EF 35%. Left atrium appears to be dilated. Aortic root normal. Early diastolic dysfunctin. Mild mitral & tricuspid regurgitation. Mild pulmonic egurgitation. RV pressure approx. 30-35 mmHg.)   • Hyperlipidemia    • Incisional hernia     s/p repair with mesh    • Ingrown toenail    • Keratoconjunctivitis sicca (CMS/HCC)    • Myopia    • Onychogryposis    • Onycholysis    • Onychomycosis    • Pain in toe    • Plantar fasciitis     chronic   • Presbyopia    • Sleep apnea    • Vitreous detachment     history of posterior right eye      • Vitreous floaters     history of right eye         Past Surgical History:   Procedure Laterality Date   • CARDIAC CATHETERIZATION  05/22/2015    Cardiac cath 99151 (Negative FFR evaluation with a value 0.90 which was not hemdynamically significant with symptoms of chest discomfort.)   • CARDIAC CATHETERIZATION  07/09/2010    Cardiac cath 84552 (Small caliber 3rd obtuse marginal branch which was subtotally occluded and filling in through left-to-left collateral. Proximal LAD with 30% stenosis. lst diagonal branch with 40-50% stenosis with evidence of good BOBY 3 flow. Diffusely diseased RCA)   • CARPAL TUNNEL RELEASE  11/21/2003    Carpal tunnel surgery (Bilateral carpal tunnel release, endoscopic.)   • CHOLECYSTECTOMY     • COLONOSCOPY  12/02/2015    Colonoscopy remove polyps 65641 (A single polyp was found in the ascending and the transverse colon;removed by hot biopsy polypectomy.)   • COLONOSCOPY  12/03/2014    Colonoscopy remove polyps 79049 (A single polyp was found in the cecum. Removed by hot biopsy polypectomy. Single polyp in the transverse colon.Removed by hot biopsy polypectomy.Diverticulosis found in the desc.colon.Rectal stump and distal sig.)   • COLOSTOMY   12/04/2014    Descending colostomy takedown with primary anastomosis.   • CYSTOSCOPY  12/04/2014    Cystoscopy (With bilateral retrogrades. Right double J stent placement. Ureteroscopy on left.)   • EXPLORATORY LAPAROTOMY  09/03/2014    sigmoid colectomy with descending colostomy or Fitz's procedure. Incidental appendectomy.   • JOINT REPLACEMENT Left 11/18/2016   • JOINT REPLACEMENT Right Jannuary 16, 2017    Knee   • KNEE ARTHROSCOPY  07/17/1997    Knee arthroscopy, surgery (Medial meniscectomy. Arthritis, medial compartment; torn medial meniscus.)   • OTHER SURGICAL HISTORY  01/22/2014   • OTHER SURGICAL HISTORY      DEBRIDE NAIL 6 OR MORE 11170 (Onychogryposis, Pain in toe, Onychomycosis) (6): 04/24/2015, 01/25/2015, 10/21/2014, 04/22/2014, 01/22/2014   • OTHER SURGICAL HISTORY  04/29/2014    EXTENDED VISUAL FIELDS STUDY 09209 (Borderline glaucoma)    • OTHER SURGICAL HISTORY      OCT DISC NFL 56109 (Borderline glaucoma) (2): 05/06/2015, 04/24/2013         Family History   Problem Relation Age of Onset   • Cancer Other    • Diabetes Other    • Heart disease Other    • Hypertension Other    • Thyroid disease Other    • Cancer Mother    • Heart disease Mother    • Diabetes Mother    • Hypertension Mother    • Thyroid disease Mother    • Cancer Father    • Heart disease Father        Allergies   Allergen Reactions   • Latex Itching   • Lortab [Hydrocodone-Acetaminophen] Hives   • Tape      Plastic tape  Silk tape       Social History     Socioeconomic History   • Marital status:      Spouse name: Not on file   • Number of children: Not on file   • Years of education: Not on file   • Highest education level: Not on file   Social Needs   • Financial resource strain: Not on file   • Food insecurity - worry: Not on file   • Food insecurity - inability: Not on file   • Transportation needs - medical: Not on file   • Transportation needs - non-medical: Not on file   Occupational History   • Not on file    Tobacco Use   • Smoking status: Former Smoker   • Smokeless tobacco: Never Used   Substance and Sexual Activity   • Alcohol use: No   • Drug use: No   • Sexual activity: Defer   Other Topics Concern   • Not on file   Social History Narrative   • Not on file         Current Outpatient Medications   Medication Sig Dispense Refill   • acetaminophen (TYLENOL) 500 MG tablet Take 1,000 mg by mouth Every 6 (Six) Hours As Needed for mild pain (1-3).     • allopurinol (ZYLOPRIM) 300 MG tablet Take 300 mg by mouth Daily.     • aspirin 81 MG chewable tablet Chew 81 mg Every Other Day.     • azithromycin (ZITHROMAX Z-AMELIA) 250 MG tablet Take 2 tablets the first day, then 1 tablet daily for 4 days. 6 tablet 0   • benzonatate (TESSALON) 200 MG capsule Take 1 capsule by mouth 3 (Three) Times a Day As Needed for Cough. 30 capsule 0   • carvedilol (COREG) 3.125 MG tablet Take 3.125 mg by mouth 2 (Two) Times a Day With Meals.     • doxycycline (VIBRAMYCIN) 100 MG capsule Take 1 capsule by mouth 2 (Two) Times a Day. 20 capsule 0   • magnesium oxide (MAGOX) 400 (241.3 MG) MG tablet tablet 400 mg Daily.  11   • pantoprazole (PROTONIX) 40 MG EC tablet Take 40 mg by mouth Daily.     • promethazine-dextromethorphan (PROMETHAZINE-DM) 6.25-15 MG/5ML syrup Take 5 mL by mouth At Night As Needed for Cough. 120 mL 0   • spironolactone (ALDACTONE) 25 MG tablet Take 25 mg by mouth Daily.     • tamsulosin (FLOMAX) 0.4 MG capsule 24 hr capsule Take 1 capsule by mouth Every Other Day.     • valsartan-hydrochlorothiazide (DIOVAN-HCT) 80-12.5 MG per tablet Take 1 tablet by mouth Daily.       No current facility-administered medications for this visit.        Review of Systems   Constitutional: Negative.    HENT: Negative.    Eyes: Negative.    Respiratory: Negative.    Cardiovascular: Negative.    Gastrointestinal: Negative.    Musculoskeletal: Negative.    Neurological: Negative.    Psychiatric/Behavioral: Negative.          OBJECTIVE    Pulse 66    "Ht 181.6 cm (71.5\")   Wt 106 kg (233 lb)   SpO2 98%   BMI 32.04 kg/m²       Physical Exam   Constitutional: He is oriented to person, place, and time. He appears well-developed and well-nourished. No distress.   HENT:   Head: Normocephalic and atraumatic.   Nose: Nose normal.   Pulmonary/Chest: Effort normal. No respiratory distress. He has no wheezes.   Neurological: He is alert and oriented to person, place, and time. He displays normal reflexes.   Skin: Skin is warm and dry. Capillary refill takes less than 2 seconds.   Psychiatric: He has a normal mood and affect. His behavior is normal.   Vitals reviewed.      Gait: Normal    Assistive Device: None    Right Lower Extremity    Cardiovascular:    DP/PT pulses palpable    CFT brisk  to all digits  Skin temp is warm to warm from proximal tibia to distal digits    Pedal hair growth present.   Improved erythema and edema noted to the right hallux.    Musculoskeletal:  Muscle strength is 5/5 for all muscle groups tested   ROM of the 1st MTP is wnl  ROM of the ankle joint is full without pain or crepitus       Dermatological:   Toenails 1 through 5 are thickened, discolored with subungual debris.  Skin is warm, dry and intact    Webspaces 1-4  are clean, dry and intact.   No subcutaneous nodules or masses noted    No open wounds noted     Neurological:   Protective sensation intact   Sensation intact to light touch        Procedures    Right hallux nail biopsy  11/14/18  12:23 PM  Risks and benefits discussed  Part of nail plate removed with nail nippers  Nail bed scrapings taken with dermal curette  Specimen will be sent to [facility]  Patient tolerated the procedure well with no complications      ASSESSMENT AND PLAN    Raleigh was seen today for follow-up.    Diagnoses and all orders for this visit:    Acute gout involving toe of right foot, unspecified cause    Onychomycosis      - Right great toe pain has improved.  joint fluid culture is positive for gout.  Patient is " to continue taking allopurinol for gout prophylaxis.  - Patient has new complaint today of potentially fungal toenails  - Diagnosis, prevention and treatment of fungal toenails was discussed with the patient in detail.  Nail biopsy and treatment with oral fungal versus nail avulsions both temporary permanent versus regular debridements were discussed.  - Patient elected for nail biopsy at this time.  - All questions were answered and the patient is in agreement with the current treatment plan.  - RTC in 3 weeks            This document has been electronically signed by Tevin Rowley DPM on November 14, 2018 12:22 PM     11/14/2018  12:22 PM

## 2018-11-21 ENCOUNTER — TELEPHONE (OUTPATIENT)
Dept: PODIATRY | Facility: CLINIC | Age: 78
End: 2018-11-21

## 2018-11-21 RX ORDER — METHYLPREDNISOLONE 4 MG/1
TABLET ORAL
Qty: 21 TABLET | Refills: 0 | Status: SHIPPED | OUTPATIENT
Start: 2018-11-21

## 2018-11-21 NOTE — TELEPHONE ENCOUNTER
SPOKE TO PATIENT AND EXPLAINED MESSAGE.    HE IS SAYING THAT IT IS EXTREMELY PAINFUL.  SAYS IT WOKE HIM UP DURING THE NIGHT.    REALLY WOULD LIKE IT DRAINED AGAIN.    WANTS TO COME IN TODAY

## 2018-11-21 NOTE — TELEPHONE ENCOUNTER
ROMA      PATIENT'S WIFE CALLED AND SAID THAT PATIENT WOKE UP AND TOE IS STARTING TO SWELL AGAIN.  SAYS THEY STILL HAVE 7 DAYS LEFT OF ANTIBIOTIC    SHOULD THEY COME IN OR JUST WAIT UNTIL ANTIBIOTICS ARE DONE?

## 2018-11-26 ENCOUNTER — OFFICE VISIT (OUTPATIENT)
Dept: PODIATRY | Facility: CLINIC | Age: 78
End: 2018-11-26

## 2018-11-26 VITALS — HEART RATE: 79 BPM | HEIGHT: 71 IN | WEIGHT: 233 LBS | BODY MASS INDEX: 32.62 KG/M2 | OXYGEN SATURATION: 94 %

## 2018-11-26 DIAGNOSIS — M79.674 GREAT TOE PAIN, RIGHT: ICD-10-CM

## 2018-11-26 DIAGNOSIS — B35.1 ONYCHOMYCOSIS: ICD-10-CM

## 2018-11-26 DIAGNOSIS — M10.9 ACUTE GOUT INVOLVING TOE OF RIGHT FOOT, UNSPECIFIED CAUSE: Primary | ICD-10-CM

## 2018-11-26 PROCEDURE — 99213 OFFICE O/P EST LOW 20 MIN: CPT | Performed by: PODIATRIST

## 2018-11-26 NOTE — PROGRESS NOTES
Raleigh Sanz  1940  78 y.o. male   Not diabetic    Patient presents today for recheck of his right great toe. He states he has finished the medication Dr. Rowley prescribed and it has helped but did not completely take away the swelling.         Chief Complaint   Patient presents with   • Right Foot - Follow-up       History of Present Illness    Patient presents to clinic today for follow-up of his right great toe.  Patient states that last Wednesday his right great toe became very swollen and red again.  He was not able to be seen in the clinic in the [facility] was called in by myself.  Patient states that the medication has significantly helped.  He still relates to some swelling.  He states that he is taking the allopurinol regularly.  He denies any other pedal complaints at this time.    Past Medical History:   Diagnosis Date   • Achilles tendinitis    • Acute atopic conjunctivitis    • Astigmatism     refractive change     • Atopic conjunctivitis    • Borderline glaucoma    • Calcaneal spur    • Cardiomyopathy (CMS/HCC)    • Carpal tunnel syndrome    • Conjunctivitis     allergic   • Coronary arteriosclerosis    • Cortical senile cataract     mild   • Degenerative joint disease     involving multiple joints    • Disease of gallbladder      s/p open jose for gangrenous cholecystitis   • Diverticular disease of colon      s/p hartmans and now take down colostomy      • Essential hypertension    • Foot pain     history of stress fracture right foot      • GERD (gastroesophageal reflux disease)    • Gout    • Gouty arthropathy    • Hearing loss     wears hearing aids      • Heel pain     posterior   • History of colonic polyps    • History of echocardiogram 09/03/2014    Echocardiogram W/ color flow 95548 (Mild [facility]. Decreased LV systolic function with EF of 35%. Mitral and AV mildly thickened.Aortic sclerosis.Tricuspid intact.)   • History of echocardiogram 02/26/2010    Echocadiogram W/O color  flow 63787 (Global hypokinesis with EF 35%. Left atrium appears to be dilated. Aortic root normal. Early diastolic dysfunctin. Mild mitral & tricuspid regurgitation. Mild pulmonic egurgitation. RV pressure approx. 30-35 mmHg.)   • Hyperlipidemia    • Incisional hernia     s/p repair with mesh    • Ingrown toenail    • Keratoconjunctivitis sicca (CMS/HCC)    • Myopia    • Onychogryposis    • Onycholysis    • Onychomycosis    • Pain in toe    • Plantar fasciitis     chronic   • Presbyopia    • Sleep apnea    • Vitreous detachment     history of posterior right eye      • Vitreous floaters     history of right eye         Past Surgical History:   Procedure Laterality Date   • CARDIAC CATHETERIZATION  05/22/2015    Cardiac cath 14971 (Negative FFR evaluation with a value 0.90 which was not hemdynamically significant with symptoms of chest discomfort.)   • CARDIAC CATHETERIZATION  07/09/2010    Cardiac cath 07515 (Small caliber 3rd obtuse marginal branch which was subtotally occluded and filling in through left-to-left collateral. Proximal LAD with 30% stenosis. lst diagonal branch with 40-50% stenosis with evidence of good BOBY 3 flow. Diffusely diseased RCA)   • CARPAL TUNNEL RELEASE  11/21/2003    Carpal tunnel surgery (Bilateral carpal tunnel release, endoscopic.)   • CHOLECYSTECTOMY     • COLONOSCOPY  12/02/2015    Colonoscopy remove polyps 32267 (A single polyp was found in the ascending and the transverse colon;removed by hot biopsy polypectomy.)   • COLONOSCOPY  12/03/2014    Colonoscopy remove polyps 65254 (A single polyp was found in the cecum. Removed by hot biopsy polypectomy. Single polyp in the transverse colon.Removed by hot biopsy polypectomy.Diverticulosis found in the desc.colon.Rectal stump and distal sig.)   • COLOSTOMY  12/04/2014    Descending colostomy takedown with primary anastomosis.   • CYSTOSCOPY  12/04/2014    Cystoscopy (With bilateral retrogrades. Right double J stent placement.  Ureteroscopy on left.)   • EXPLORATORY LAPAROTOMY  09/03/2014    sigmoid colectomy with descending colostomy or Fitz's procedure. Incidental appendectomy.   • JOINT REPLACEMENT Left 11/18/2016   • JOINT REPLACEMENT Right Jannuary 16, 2017    Knee   • KNEE ARTHROSCOPY  07/17/1997    Knee arthroscopy, surgery (Medial meniscectomy. Arthritis, medial compartment; torn medial meniscus.)   • OTHER SURGICAL HISTORY  01/22/2014   • OTHER SURGICAL HISTORY      DEBRIDE NAIL 6 OR MORE 49534 (Onychogryposis, Pain in toe, Onychomycosis) (6): 04/24/2015, 01/25/2015, 10/21/2014, 04/22/2014, 01/22/2014   • OTHER SURGICAL HISTORY  04/29/2014    EXTENDED VISUAL FIELDS STUDY 15568 (Borderline glaucoma)    • OTHER SURGICAL HISTORY      OCT DISC NFL 62484 (Borderline glaucoma) (2): 05/06/2015, 04/24/2013         Family History   Problem Relation Age of Onset   • Cancer Other    • Diabetes Other    • Heart disease Other    • Hypertension Other    • Thyroid disease Other    • Cancer Mother    • Heart disease Mother    • Diabetes Mother    • Hypertension Mother    • Thyroid disease Mother    • Cancer Father    • Heart disease Father        Allergies   Allergen Reactions   • Latex Itching   • Lortab [Hydrocodone-Acetaminophen] Hives   • Tape      Plastic tape  Silk tape       Social History     Socioeconomic History   • Marital status:      Spouse name: Not on file   • Number of children: Not on file   • Years of education: Not on file   • Highest education level: Not on file   Social Needs   • Financial resource strain: Not on file   • Food insecurity - worry: Not on file   • Food insecurity - inability: Not on file   • Transportation needs - medical: Not on file   • Transportation needs - non-medical: Not on file   Occupational History   • Not on file   Tobacco Use   • Smoking status: Former Smoker   • Smokeless tobacco: Never Used   Substance and Sexual Activity   • Alcohol use: No   • Drug use: No   • Sexual activity: Defer  "  Other Topics Concern   • Not on file   Social History Narrative   • Not on file         Current Outpatient Medications   Medication Sig Dispense Refill   • acetaminophen (TYLENOL) 500 MG tablet Take 1,000 mg by mouth Every 6 (Six) Hours As Needed for mild pain (1-3).     • allopurinol (ZYLOPRIM) 300 MG tablet Take 300 mg by mouth Daily.     • aspirin 81 MG chewable tablet Chew 81 mg Every Other Day.     • azithromycin (ZITHROMAX Z-AMELIA) 250 MG tablet Take 2 tablets the first day, then 1 tablet daily for 4 days. 6 tablet 0   • benzonatate (TESSALON) 200 MG capsule Take 1 capsule by mouth 3 (Three) Times a Day As Needed for Cough. 30 capsule 0   • carvedilol (COREG) 3.125 MG tablet Take 3.125 mg by mouth 2 (Two) Times a Day With Meals.     • doxycycline (VIBRAMYCIN) 100 MG capsule Take 1 capsule by mouth 2 (Two) Times a Day. 20 capsule 0   • magnesium oxide (MAGOX) 400 (241.3 MG) MG tablet tablet 400 mg Daily.  11   • MethylPREDNISolone (MEDROL, AMELIA,) 4 MG tablet Take as directed 21 tablet 0   • pantoprazole (PROTONIX) 40 MG EC tablet Take 40 mg by mouth Daily.     • promethazine-dextromethorphan (PROMETHAZINE-DM) 6.25-15 MG/5ML syrup Take 5 mL by mouth At Night As Needed for Cough. 120 mL 0   • spironolactone (ALDACTONE) 25 MG tablet Take 25 mg by mouth Daily.     • tamsulosin (FLOMAX) 0.4 MG capsule 24 hr capsule Take 1 capsule by mouth Every Other Day.     • valsartan-hydrochlorothiazide (DIOVAN-HCT) 80-12.5 MG per tablet Take 1 tablet by mouth Daily.       No current facility-administered medications for this visit.        Review of Systems   Constitutional: Negative.    HENT: Negative.    Eyes: Negative.    Respiratory: Negative.    Cardiovascular: Negative.    Gastrointestinal: Negative.    Musculoskeletal: Negative.    Neurological: Negative.    Psychiatric/Behavioral: Negative.          OBJECTIVE    Pulse 79   Ht 181.6 cm (71.5\")   Wt 106 kg (233 lb)   SpO2 94%   BMI 32.05 kg/m²       Physical Exam "   Constitutional: He is oriented to person, place, and time. He appears well-developed and well-nourished. No distress.   HENT:   Head: Normocephalic and atraumatic.   Nose: Nose normal.   Eyes: Conjunctivae and EOM are normal. Pupils are equal, round, and reactive to light.   Pulmonary/Chest: Effort normal. No respiratory distress. He has no wheezes.   Neurological: He is alert and oriented to person, place, and time. He displays normal reflexes.   Skin: Skin is warm and dry. Capillary refill takes less than 2 seconds.   Psychiatric: He has a normal mood and affect. His behavior is normal.   Vitals reviewed.      Gait: Normal    Assistive Device: None    Right Lower Extremity    Cardiovascular:    DP/PT pulses palpable    CFT brisk  to all digits  Skin temp is warm to warm from proximal tibia to distal digits    Pedal hair growth present.   Minimal erythema and edema noted to the right hallux.    Musculoskeletal:  Muscle strength is 5/5 for all muscle groups tested   ROM of the 1st MTP is wnl without pain   ROM of the ankle joint is full without pain or crepitus       Dermatological:   Toenails 1 through 5 are thickened, discolored with subungual debris.  Skin is warm, dry and intact    Webspaces 1-4  are clean, dry and intact.   No subcutaneous nodules or masses noted    No open wounds noted     Neurological:   Protective sensation intact   Sensation intact to light touch        Procedures      ASSESSMENT AND PLAN    Raleigh was seen today for follow-up.    Diagnoses and all orders for this visit:    Acute gout involving toe of right foot, unspecified cause    Onychomycosis    Great toe pain, right      - Right great toe has improved and is no longer painful.  - Continue allopurinol daily. Allopurinol to be provided by patient primary care provider.  - Educated patient on gout diet.  - Nail biopsy results not yet finalized  - All questions were answered and the patient is in agreement with the current treatment  plan.  - RTC in 2 weeks            This document has been electronically signed by Tevin Rowley DPM on November 27, 2018 9:25 AM     11/27/2018  9:25 AM

## 2018-12-10 ENCOUNTER — OFFICE VISIT (OUTPATIENT)
Dept: PODIATRY | Facility: CLINIC | Age: 78
End: 2018-12-10

## 2018-12-10 VITALS — OXYGEN SATURATION: 98 % | BODY MASS INDEX: 31.56 KG/M2 | HEART RATE: 74 BPM | WEIGHT: 233 LBS | HEIGHT: 72 IN

## 2018-12-10 DIAGNOSIS — B35.1 ONYCHOMYCOSIS: Primary | ICD-10-CM

## 2018-12-10 PROCEDURE — 99213 OFFICE O/P EST LOW 20 MIN: CPT | Performed by: PODIATRIST

## 2018-12-10 RX ORDER — INDOMETHACIN 75 MG/1
75 CAPSULE, EXTENDED RELEASE ORAL 2 TIMES DAILY
Refills: 2 | COMMUNITY
Start: 2018-12-03

## 2018-12-10 NOTE — PROGRESS NOTES
Raleigh Geepkins  1940  78 y.o. male   Not diabetic    Patient presents today for recheck of his right great toe. He would also like to have his office notes and lab work sent to Felicitas Brush MD.        Chief Complaint   Patient presents with   • Right Foot - Follow-up       History of Present Illness    Patient presents to clinic today for follow-up of his toenail biopsy.  He denies pain to the right foot.    Past Medical History:   Diagnosis Date   • Achilles tendinitis    • Acute atopic conjunctivitis    • Astigmatism     refractive change     • Atopic conjunctivitis    • Borderline glaucoma    • Calcaneal spur    • Cardiomyopathy (CMS/HCC)    • Carpal tunnel syndrome    • Conjunctivitis     allergic   • Coronary arteriosclerosis    • Cortical senile cataract     mild   • Degenerative joint disease     involving multiple joints    • Disease of gallbladder      s/p open jose for gangrenous cholecystitis   • Diverticular disease of colon      s/p hartmans and now take down colostomy      • Essential hypertension    • Foot pain     history of stress fracture right foot      • GERD (gastroesophageal reflux disease)    • Gout    • Gouty arthropathy    • Hearing loss     wears hearing aids      • Heel pain     posterior   • History of colonic polyps    • History of echocardiogram 09/03/2014    Echocardiogram W/ color flow 69933 (Mild [facility]. Decreased LV systolic function with EF of 35%. Mitral and AV mildly thickened.Aortic sclerosis.Tricuspid intact.)   • History of echocardiogram 02/26/2010    Echocadiogram W/O color flow 40220 (Global hypokinesis with EF 35%. Left atrium appears to be dilated. Aortic root normal. Early diastolic dysfunctin. Mild mitral & tricuspid regurgitation. Mild pulmonic egurgitation. RV pressure approx. 30-35 mmHg.)   • Hyperlipidemia    • Incisional hernia     s/p repair with mesh    • Ingrown toenail    • Keratoconjunctivitis sicca (CMS/HCC)    • Myopia    • Onychogryposis    •  Onycholysis    • Onychomycosis    • Pain in toe    • Plantar fasciitis     chronic   • Presbyopia    • Sleep apnea    • Vitreous detachment     history of posterior right eye      • Vitreous floaters     history of right eye         Past Surgical History:   Procedure Laterality Date   • CARDIAC CATHETERIZATION  05/22/2015    Cardiac cath 93236 (Negative FFR evaluation with a value 0.90 which was not hemdynamically significant with symptoms of chest discomfort.)   • CARDIAC CATHETERIZATION  07/09/2010    Cardiac cath 95406 (Small caliber 3rd obtuse marginal branch which was subtotally occluded and filling in through left-to-left collateral. Proximal LAD with 30% stenosis. lst diagonal branch with 40-50% stenosis with evidence of good BOBY 3 flow. Diffusely diseased RCA)   • CARPAL TUNNEL RELEASE  11/21/2003    Carpal tunnel surgery (Bilateral carpal tunnel release, endoscopic.)   • CHOLECYSTECTOMY     • COLONOSCOPY  12/02/2015    Colonoscopy remove polyps 34391 (A single polyp was found in the ascending and the transverse colon;removed by hot biopsy polypectomy.)   • COLONOSCOPY  12/03/2014    Colonoscopy remove polyps 25178 (A single polyp was found in the cecum. Removed by hot biopsy polypectomy. Single polyp in the transverse colon.Removed by hot biopsy polypectomy.Diverticulosis found in the desc.colon.Rectal stump and distal sig.)   • COLOSTOMY  12/04/2014    Descending colostomy takedown with primary anastomosis.   • CYSTOSCOPY  12/04/2014    Cystoscopy (With bilateral retrogrades. Right double J stent placement. Ureteroscopy on left.)   • EXPLORATORY LAPAROTOMY  09/03/2014    sigmoid colectomy with descending colostomy or Fitz's procedure. Incidental appendectomy.   • JOINT REPLACEMENT Left 11/18/2016   • JOINT REPLACEMENT Right Jannuary 16, 2017    Knee   • KNEE ARTHROSCOPY  07/17/1997    Knee arthroscopy, surgery (Medial meniscectomy. Arthritis, medial compartment; torn medial meniscus.)   • OTHER  SURGICAL HISTORY  01/22/2014   • OTHER SURGICAL HISTORY      DEBRIDE NAIL 6 OR MORE 27750 (Onychogryposis, Pain in toe, Onychomycosis) (6): 04/24/2015, 01/25/2015, 10/21/2014, 04/22/2014, 01/22/2014   • OTHER SURGICAL HISTORY  04/29/2014    EXTENDED VISUAL FIELDS STUDY 22987 (Borderline glaucoma)    • OTHER SURGICAL HISTORY      OCT DISC NFL 16387 (Borderline glaucoma) (2): 05/06/2015, 04/24/2013         Family History   Problem Relation Age of Onset   • Cancer Other    • Diabetes Other    • Heart disease Other    • Hypertension Other    • Thyroid disease Other    • Cancer Mother    • Heart disease Mother    • Diabetes Mother    • Hypertension Mother    • Thyroid disease Mother    • Cancer Father    • Heart disease Father        Allergies   Allergen Reactions   • Latex Itching   • Lortab [Hydrocodone-Acetaminophen] Hives   • Tape      Plastic tape  Silk tape       Social History     Socioeconomic History   • Marital status:      Spouse name: Not on file   • Number of children: Not on file   • Years of education: Not on file   • Highest education level: Not on file   Social Needs   • Financial resource strain: Not on file   • Food insecurity - worry: Not on file   • Food insecurity - inability: Not on file   • Transportation needs - medical: Not on file   • Transportation needs - non-medical: Not on file   Occupational History   • Not on file   Tobacco Use   • Smoking status: Former Smoker   • Smokeless tobacco: Never Used   Substance and Sexual Activity   • Alcohol use: No   • Drug use: No   • Sexual activity: Defer   Other Topics Concern   • Not on file   Social History Narrative   • Not on file         Current Outpatient Medications   Medication Sig Dispense Refill   • acetaminophen (TYLENOL) 500 MG tablet Take 1,000 mg by mouth Every 6 (Six) Hours As Needed for mild pain (1-3).     • allopurinol (ZYLOPRIM) 300 MG tablet Take 300 mg by mouth Daily.     • aspirin 81 MG chewable tablet Chew 81 mg Every Other  "Day.     • azithromycin (ZITHROMAX Z-AMELIA) 250 MG tablet Take 2 tablets the first day, then 1 tablet daily for 4 days. 6 tablet 0   • benzonatate (TESSALON) 200 MG capsule Take 1 capsule by mouth 3 (Three) Times a Day As Needed for Cough. 30 capsule 0   • carvedilol (COREG) 3.125 MG tablet Take 3.125 mg by mouth 2 (Two) Times a Day With Meals.     • doxycycline (VIBRAMYCIN) 100 MG capsule Take 1 capsule by mouth 2 (Two) Times a Day. 20 capsule 0   • magnesium oxide (MAGOX) 400 (241.3 MG) MG tablet tablet 400 mg Daily.  11   • MethylPREDNISolone (MEDROL, AMELIA,) 4 MG tablet Take as directed 21 tablet 0   • pantoprazole (PROTONIX) 40 MG EC tablet Take 40 mg by mouth Daily.     • promethazine-dextromethorphan (PROMETHAZINE-DM) 6.25-15 MG/5ML syrup Take 5 mL by mouth At Night As Needed for Cough. 120 mL 0   • tamsulosin (FLOMAX) 0.4 MG capsule 24 hr capsule Take 1 capsule by mouth Every Other Day.     • valsartan-hydrochlorothiazide (DIOVAN-HCT) 80-12.5 MG per tablet Take 1 tablet by mouth Daily.     • indomethacin SR (INDOCIN SR) 75 MG CR capsule Take 75 mg by mouth 2 (Two) Times a Day.  2     No current facility-administered medications for this visit.        Review of Systems   Constitutional: Negative.    HENT: Negative.    Eyes: Negative.    Respiratory: Negative.    Cardiovascular: Negative.    Gastrointestinal: Negative.    Neurological: Negative.    Psychiatric/Behavioral: Negative.          OBJECTIVE    Pulse 74   Ht 181.6 cm (71.5\")   Wt 106 kg (233 lb)   SpO2 98%   BMI 32.04 kg/m²       Physical Exam   Constitutional: He is oriented to person, place, and time. He appears well-developed and well-nourished. No distress.   HENT:   Head: Normocephalic and atraumatic.   Nose: Nose normal.   Pulmonary/Chest: Effort normal. No respiratory distress. He has no wheezes.   Neurological: He is alert and oriented to person, place, and time. He displays normal reflexes.   Psychiatric: He has a normal mood and affect. His " behavior is normal.   Vitals reviewed.      Gait: Normal    Assistive Device: None    Right Lower Extremity    Cardiovascular:    DP/PT pulses palpable    CFT brisk  to all digits  Skin temp is warm to warm from proximal tibia to distal digits    Pedal hair growth present.   Slight erythema and edema noted to the right hallux.    Musculoskeletal:  Muscle strength is 5/5 for all muscle groups tested   ROM of the 1st MTP is wnl without pain   ROM of the ankle joint is full without pain or crepitus       Dermatological:   Toenails 1 through 5 are thickened, discolored with subungual debris.  Skin is warm, dry and intact    Webspaces 1-4  are clean, dry and intact.   No subcutaneous nodules or masses noted    No open wounds noted     Neurological:   Protective sensation intact   Sensation intact to light touch        Procedures      ASSESSMENT AND PLAN    Raleigh was seen today for follow-up.    Diagnoses and all orders for this visit:    Onychomycosis      - Nail biopsy results are positive for fungus.  Discussed treatment options for toenail fungus including oral Lamisil, topical Penlac, toenail avulsion and regular debridements.  Patient is considering permanent toenail avulsion.  He will return after the first of the year for scheduling a procedure.  - Continue gout prophylaxis medication and diet modification  - All questions were answered and the patient is in agreement with the current treatment plan.  - RTC as needed            This document has been electronically signed by Tevin Rowley DPM on December 10, 2018 10:32 AM     12/10/2018  10:32 AM

## 2019-01-09 ENCOUNTER — OFFICE VISIT (OUTPATIENT)
Dept: PODIATRY | Facility: CLINIC | Age: 79
End: 2019-01-09

## 2019-01-09 VITALS — OXYGEN SATURATION: 98 % | WEIGHT: 233 LBS | HEIGHT: 71 IN | BODY MASS INDEX: 32.62 KG/M2 | HEART RATE: 70 BPM

## 2019-01-09 DIAGNOSIS — M79.675 GREAT TOE PAIN, LEFT: ICD-10-CM

## 2019-01-09 DIAGNOSIS — M79.671 RIGHT FOOT PAIN: Primary | ICD-10-CM

## 2019-01-09 PROCEDURE — 99214 OFFICE O/P EST MOD 30 MIN: CPT | Performed by: PODIATRIST

## 2019-01-09 NOTE — PROGRESS NOTES
Raleigh Sanz  1940  78 y.o. male   Not diabetic    Patient presents today for recheck of his right great toe.         Chief Complaint   Patient presents with   • Right Foot - Follow-up       History of Present Illness    Patient presents to clinic today with chief complaint of right great toe swelling and pain.  Patient states that 1 week ago his great toe became swollen and red.  It was very painful.  It has progressively gotten better.  Today he states that he has a burning in his left great toe.  He denies any injuries or trauma.  He has no other pedal complaints.      Past Medical History:   Diagnosis Date   • Achilles tendinitis    • Acute atopic conjunctivitis    • Astigmatism     refractive change     • Atopic conjunctivitis    • Borderline glaucoma    • Calcaneal spur    • Cardiomyopathy (CMS/HCC)    • Carpal tunnel syndrome    • Conjunctivitis     allergic   • Coronary arteriosclerosis    • Cortical senile cataract     mild   • Degenerative joint disease     involving multiple joints    • Disease of gallbladder      s/p open jose for gangrenous cholecystitis   • Diverticular disease of colon      s/p hartmans and now take down colostomy      • Essential hypertension    • Foot pain     history of stress fracture right foot      • GERD (gastroesophageal reflux disease)    • Gout    • Gouty arthropathy    • Hearing loss     wears hearing aids      • Heel pain     posterior   • History of colonic polyps    • History of echocardiogram 09/03/2014    Echocardiogram W/ color flow 50346 (Mild [facility]. Decreased LV systolic function with EF of 35%. Mitral and AV mildly thickened.Aortic sclerosis.Tricuspid intact.)   • History of echocardiogram 02/26/2010    Echocadiogram W/O color flow 97320 (Global hypokinesis with EF 35%. Left atrium appears to be dilated. Aortic root normal. Early diastolic dysfunctin. Mild mitral & tricuspid regurgitation. Mild pulmonic egurgitation. RV pressure approx. 30-35 mmHg.)    • Hyperlipidemia    • Incisional hernia     s/p repair with mesh    • Ingrown toenail    • Keratoconjunctivitis sicca (CMS/HCC)    • Myopia    • Onychogryposis    • Onycholysis    • Onychomycosis    • Pain in toe    • Plantar fasciitis     chronic   • Presbyopia    • Sleep apnea    • Vitreous detachment     history of posterior right eye      • Vitreous floaters     history of right eye         Past Surgical History:   Procedure Laterality Date   • CARDIAC CATHETERIZATION  05/22/2015    Cardiac cath 10327 (Negative FFR evaluation with a value 0.90 which was not hemdynamically significant with symptoms of chest discomfort.)   • CARDIAC CATHETERIZATION  07/09/2010    Cardiac cath 96988 (Small caliber 3rd obtuse marginal branch which was subtotally occluded and filling in through left-to-left collateral. Proximal LAD with 30% stenosis. lst diagonal branch with 40-50% stenosis with evidence of good BOBY 3 flow. Diffusely diseased RCA)   • CARPAL TUNNEL RELEASE  11/21/2003    Carpal tunnel surgery (Bilateral carpal tunnel release, endoscopic.)   • CHOLECYSTECTOMY     • COLONOSCOPY  12/02/2015    Colonoscopy remove polyps 28887 (A single polyp was found in the ascending and the transverse colon;removed by hot biopsy polypectomy.)   • COLONOSCOPY  12/03/2014    Colonoscopy remove polyps 17745 (A single polyp was found in the cecum. Removed by hot biopsy polypectomy. Single polyp in the transverse colon.Removed by hot biopsy polypectomy.Diverticulosis found in the desc.colon.Rectal stump and distal sig.)   • COLONOSCOPY N/A 6/28/2017    Procedure: COLONOSCOPY;  Surgeon: Shady Andrew MD;  Location: [facility] ENDOSCOPY;  Service:    • COLOSTOMY  12/04/2014    Descending colostomy takedown with primary anastomosis.   • CYSTOSCOPY  12/04/2014    Cystoscopy (With bilateral retrogrades. Right double J stent placement. Ureteroscopy on left.)   • EXPLORATORY LAPAROTOMY  09/03/2014    sigmoid colectomy with descending colostomy  or Fitz's procedure. Incidental appendectomy.   • JOINT REPLACEMENT Left 11/18/2016   • JOINT REPLACEMENT Right Jannuary 16, 2017    Knee   • KNEE ARTHROSCOPY  07/17/1997    Knee arthroscopy, surgery (Medial meniscectomy. Arthritis, medial compartment; torn medial meniscus.)   • OTHER SURGICAL HISTORY  01/22/2014   • OTHER SURGICAL HISTORY      DEBRIDE NAIL 6 OR MORE 66921 (Onychogryposis, Pain in toe, Onychomycosis) (6): 04/24/2015, 01/25/2015, 10/21/2014, 04/22/2014, 01/22/2014   • OTHER SURGICAL HISTORY  04/29/2014    EXTENDED VISUAL FIELDS STUDY 56529 (Borderline glaucoma)    • OTHER SURGICAL HISTORY      OCT DISC NFL 30082 (Borderline glaucoma) (2): 05/06/2015, 04/24/2013         Family History   Problem Relation Age of Onset   • Cancer Other    • Diabetes Other    • Heart disease Other    • Hypertension Other    • Thyroid disease Other    • Cancer Mother    • Heart disease Mother    • Diabetes Mother    • Hypertension Mother    • Thyroid disease Mother    • Cancer Father    • Heart disease Father        Allergies   Allergen Reactions   • Latex Itching   • Lortab [Hydrocodone-Acetaminophen] Hives   • Tape      Plastic tape  Silk tape       Social History     Socioeconomic History   • Marital status:      Spouse name: Not on file   • Number of children: Not on file   • Years of education: Not on file   • Highest education level: Not on file   Social Needs   • Financial resource strain: Not on file   • Food insecurity - worry: Not on file   • Food insecurity - inability: Not on file   • Transportation needs - medical: Not on file   • Transportation needs - non-medical: Not on file   Occupational History   • Not on file   Tobacco Use   • Smoking status: Former Smoker   • Smokeless tobacco: Never Used   Substance and Sexual Activity   • Alcohol use: No   • Drug use: No   • Sexual activity: Defer   Other Topics Concern   • Not on file   Social History Narrative   • Not on file         Current Outpatient  "Medications   Medication Sig Dispense Refill   • acetaminophen (TYLENOL) 500 MG tablet Take 1,000 mg by mouth Every 6 (Six) Hours As Needed for mild pain (1-3).     • allopurinol (ZYLOPRIM) 300 MG tablet Take 300 mg by mouth Daily.     • aspirin 81 MG chewable tablet Chew 81 mg Every Other Day.     • azithromycin (ZITHROMAX Z-AMELIA) 250 MG tablet Take 2 tablets the first day, then 1 tablet daily for 4 days. 6 tablet 0   • benzonatate (TESSALON) 200 MG capsule Take 1 capsule by mouth 3 (Three) Times a Day As Needed for Cough. 30 capsule 0   • carvedilol (COREG) 3.125 MG tablet Take 3.125 mg by mouth 2 (Two) Times a Day With Meals.     • doxycycline (VIBRAMYCIN) 100 MG capsule Take 1 capsule by mouth 2 (Two) Times a Day. 20 capsule 0   • indomethacin SR (INDOCIN SR) 75 MG CR capsule Take 75 mg by mouth 2 (Two) Times a Day.  2   • magnesium oxide (MAGOX) 400 (241.3 MG) MG tablet tablet 400 mg Daily.  11   • MethylPREDNISolone (MEDROL, AMELIA,) 4 MG tablet Take as directed 21 tablet 0   • pantoprazole (PROTONIX) 40 MG EC tablet Take 40 mg by mouth Daily.     • promethazine-dextromethorphan (PROMETHAZINE-DM) 6.25-15 MG/5ML syrup Take 5 mL by mouth At Night As Needed for Cough. 120 mL 0   • tamsulosin (FLOMAX) 0.4 MG capsule 24 hr capsule Take 1 capsule by mouth Every Other Day.     • valsartan-hydrochlorothiazide (DIOVAN-HCT) 80-12.5 MG per tablet Take 1 tablet by mouth Daily.     • diclofenac (VOLTAREN) 3 % gel gel Apply  topically to the appropriate area as directed 2 (Two) Times a Day. 100 g 1     No current facility-administered medications for this visit.        Review of Systems   Constitutional: Negative.    HENT: Negative.    Eyes: Negative.    Respiratory: Negative.    Cardiovascular: Negative.    Gastrointestinal: Negative.    Musculoskeletal:        Great toe pain b/l   Skin: Negative.    Neurological: Negative.    Psychiatric/Behavioral: Negative.          OBJECTIVE    Pulse 70   Ht 181.6 cm (71.5\")   Wt 106 kg " (233 lb)   SpO2 98%   BMI 32.05 kg/m²       Physical Exam   Constitutional: He is oriented to person, place, and time. He appears well-developed and well-nourished. No distress.   HENT:   Head: Normocephalic and atraumatic.   Nose: Nose normal.   Pulmonary/Chest: Effort normal. No respiratory distress. He has no wheezes.   Neurological: He is alert and oriented to person, place, and time. He displays normal reflexes.   Psychiatric: He has a normal mood and affect. His behavior is normal.   Vitals reviewed.      Gait: Normal    Assistive Device: None    Right Lower Extremity    Cardiovascular:    DP/PT pulses palpable    CFT brisk  to all digits  Skin temp is warm to warm from proximal tibia to distal digits    Pedal hair growth present.   Minimal erythema and edema noted to the right hallux.    Musculoskeletal:  Muscle strength is 5/5 for all muscle groups tested   ROM of the 1st MTP is wnl without pain   ROM of the ankle joint is full without pain or crepitus     No pain on palpation to left great toe    Dermatological:   Skin is warm, dry and intact    Webspaces 1-4  are clean, dry and intact.   No subcutaneous nodules or masses noted    No open wounds noted     Neurological:   Protective sensation intact   Sensation intact to light touch        Procedures      ASSESSMENT AND PLAN    Raleigh was seen today for follow-up.    Diagnoses and all orders for this visit:    Right foot pain  -     XR Foot 3+ View Right    Great toe pain, left  -     XR Foot 3+ View Left    Other orders  -     diclofenac (VOLTAREN) 3 % gel gel; Apply  topically to the appropriate area as directed 2 (Two) Times a Day.      - x-rays taken and reviewed  - Patient continues to have gout flares to right great toe  - Continue gout prophylaxis medication and diet modification  - Rx for Voltaren gel  - All questions were answered and the patient is in agreement with the current treatment plan.  - RTC as needed            This document has been  electronically signed by Tevin Rowley DPM on January 9, 2019 12:21 PM     1/9/2019  12:21 PM

## 2020-04-22 ENCOUNTER — TELEPHONE (OUTPATIENT)
Dept: PODIATRY | Facility: CLINIC | Age: 80
End: 2020-04-22

## 2020-04-22 NOTE — TELEPHONE ENCOUNTER
Halley:    Patient complaining of left heel pain, states exercise does not help, and he can barely walk. He was last seen 1/09/2019.  He does not have any open areas, and no injuries or traumas.  Told him that it would be June we could schedule he states he would be in [place] by then.  Told patient wife then I could put a message into Dr. Rowley but he is out of the office until Monday. So probably would not here an answer until then.  Please advise patient

## 2020-04-22 NOTE — TELEPHONE ENCOUNTER
explained to patient's wife that Mr. Sanz needed to take an anti-inflammatory; she states he can't because it makes his heart flutter.  That he needed to do stretches; she states he is but it doesn't help.  That he needs to roll his foot over a frozen water bottle; he sits with his foot directly on an ice pack.    I told her to please do the things I recommended and I would mail them some information on Plantar Fasciitis.    I will also ask Dr Rowley on Monday about a possible steroid pack to help Mr Sanz out until he can be seen at a later date.    SJ

## 2020-05-04 ENCOUNTER — OFFICE VISIT (OUTPATIENT)
Dept: PODIATRY | Facility: CLINIC | Age: 80
End: 2020-05-04

## 2020-05-04 VITALS — HEIGHT: 72 IN | HEART RATE: 70 BPM | WEIGHT: 233 LBS | OXYGEN SATURATION: 98 % | BODY MASS INDEX: 31.56 KG/M2

## 2020-05-04 DIAGNOSIS — M79.672 LEFT FOOT PAIN: ICD-10-CM

## 2020-05-04 DIAGNOSIS — M72.2 PLANTAR FASCIITIS: Primary | ICD-10-CM

## 2020-05-04 PROCEDURE — 20550 NJX 1 TENDON SHEATH/LIGAMENT: CPT | Performed by: PODIATRIST

## 2020-05-04 PROCEDURE — 99213 OFFICE O/P EST LOW 20 MIN: CPT | Performed by: PODIATRIST

## 2020-05-04 RX ADMIN — TRIAMCINOLONE ACETONIDE 10 MG: 40 INJECTION, SUSPENSION INTRA-ARTICULAR; INTRAMUSCULAR at 13:40

## 2020-05-04 RX ADMIN — DEXAMETHASONE SODIUM PHOSPHATE 2 MG: 4 INJECTION, SOLUTION INTRA-ARTICULAR; INTRALESIONAL; INTRAMUSCULAR; INTRAVENOUS; SOFT TISSUE at 13:40

## 2020-05-04 RX ADMIN — BUPIVACAINE HYDROCHLORIDE 1 ML: 5 INJECTION, SOLUTION EPIDURAL; INTRACAUDAL at 13:40

## 2020-05-04 NOTE — PROGRESS NOTES
Raleigh Sanz  1940  80 y.o. male     Patient presents today with a complaint of left foot pain.     05/04/2020     Chief Complaint   Patient presents with   • Left Foot - Pain   • Plantar Fasciitis       History of Present Illness    Pleasant 72-year-old male presents to clinic with chief complaint of left heel pain.  Heel pain is been present for approximately 1 month.  Is located to the bottom of his heel.  He describes a sharp and rates as a 9 out of 10.  Pain is worse with the first step in the morning or after periods of rest.  There are no associated injuries or trauma.  He has no other complaints.    Past Medical History:   Diagnosis Date   • Achilles tendinitis    • Acute atopic conjunctivitis    • Astigmatism     refractive change     • Atopic conjunctivitis    • Borderline glaucoma    • Calcaneal spur    • Cardiomyopathy (CMS/HCC)    • Carpal tunnel syndrome    • Conjunctivitis     allergic   • Coronary arteriosclerosis    • Cortical senile cataract     mild   • Degenerative joint disease     involving multiple joints    • Disease of gallbladder      s/p open jose for gangrenous cholecystitis   • Diverticular disease of colon      s/p hartmans and now take down colostomy      • Essential hypertension    • Foot pain     history of stress fracture right foot      • GERD (gastroesophageal reflux disease)    • Gout    • Gouty arthropathy    • Hearing loss     wears hearing aids      • Heel pain     posterior   • History of colonic polyps    • History of echocardiogram 09/03/2014    Echocardiogram W/ color flow 75737 (Mild [facility]. Decreased LV systolic function with EF of 35%. Mitral and AV mildly thickened.Aortic sclerosis.Tricuspid intact.)   • History of echocardiogram 02/26/2010    Echocadiogram W/O color flow 63627 (Global hypokinesis with EF 35%. Left atrium appears to be dilated. Aortic root normal. Early diastolic dysfunctin. Mild mitral & tricuspid regurgitation. Mild pulmonic  egurgitation. RV pressure approx. 30-35 mmHg.)   • Hyperlipidemia    • Incisional hernia     s/p repair with mesh    • Ingrown toenail    • Keratoconjunctivitis sicca (CMS/HCC)    • Myopia    • Onychogryposis    • Onycholysis    • Onychomycosis    • Pain in toe    • Plantar fasciitis     chronic   • Presbyopia    • Sleep apnea    • Vitreous detachment     history of posterior right eye      • Vitreous floaters     history of right eye         Past Surgical History:   Procedure Laterality Date   • CARDIAC CATHETERIZATION  05/22/2015    Cardiac cath 10358 (Negative FFR evaluation with a value 0.90 which was not hemdynamically significant with symptoms of chest discomfort.)   • CARDIAC CATHETERIZATION  07/09/2010    Cardiac cath 71572 (Small caliber 3rd obtuse marginal branch which was subtotally occluded and filling in through left-to-left collateral. Proximal LAD with 30% stenosis. lst diagonal branch with 40-50% stenosis with evidence of good BOBY 3 flow. Diffusely diseased RCA)   • CARPAL TUNNEL RELEASE  11/21/2003    Carpal tunnel surgery (Bilateral carpal tunnel release, endoscopic.)   • CHOLECYSTECTOMY     • COLONOSCOPY  12/02/2015    Colonoscopy remove polyps 82324 (A single polyp was found in the ascending and the transverse colon;removed by hot biopsy polypectomy.)   • COLONOSCOPY  12/03/2014    Colonoscopy remove polyps 75865 (A single polyp was found in the cecum. Removed by hot biopsy polypectomy. Single polyp in the transverse colon.Removed by hot biopsy polypectomy.Diverticulosis found in the desc.colon.Rectal stump and distal sig.)   • COLONOSCOPY N/A 6/28/2017    Procedure: COLONOSCOPY;  Surgeon: Shady Andrew MD;  Location: [facility] ENDOSCOPY;  Service:    • COLOSTOMY  12/04/2014    Descending colostomy takedown with primary anastomosis.   • CYSTOSCOPY  12/04/2014    Cystoscopy (With bilateral retrogrades. Right double J stent placement. Ureteroscopy on left.)   • EXPLORATORY LAPAROTOMY  09/03/2014     sigmoid colectomy with descending colostomy or Fitz's procedure. Incidental appendectomy.   • JOINT REPLACEMENT Left 11/18/2016   • JOINT REPLACEMENT Right Jannuary 16, 2017    Knee   • KNEE ARTHROSCOPY  07/17/1997    Knee arthroscopy, surgery (Medial meniscectomy. Arthritis, medial compartment; torn medial meniscus.)   • OTHER SURGICAL HISTORY  01/22/2014   • OTHER SURGICAL HISTORY      DEBRIDE NAIL 6 OR MORE 90559 (Onychogryposis, Pain in toe, Onychomycosis) (6): 04/24/2015, 01/25/2015, 10/21/2014, 04/22/2014, 01/22/2014   • OTHER SURGICAL HISTORY  04/29/2014    EXTENDED VISUAL FIELDS STUDY 48432 (Borderline glaucoma)    • OTHER SURGICAL HISTORY      OCT DISC NFL 00277 (Borderline glaucoma) (2): 05/06/2015, 04/24/2013         Family History   Problem Relation Age of Onset   • Cancer Other    • Diabetes Other    • Heart disease Other    • Hypertension Other    • Thyroid disease Other    • Cancer Mother    • Heart disease Mother    • Diabetes Mother    • Hypertension Mother    • Thyroid disease Mother    • Cancer Father    • Heart disease Father        Allergies   Allergen Reactions   • Latex Itching   • Lortab [Hydrocodone-Acetaminophen] Hives   • Tape      Plastic tape  Silk tape       Social History     Socioeconomic History   • Marital status:      Spouse name: Not on file   • Number of children: Not on file   • Years of education: Not on file   • Highest education level: Not on file   Tobacco Use   • Smoking status: Former Smoker   • Smokeless tobacco: Never Used   Substance and Sexual Activity   • Alcohol use: No   • Drug use: No   • Sexual activity: Defer         Current Outpatient Medications   Medication Sig Dispense Refill   • acetaminophen (TYLENOL) 500 MG tablet Take 1,000 mg by mouth Every 6 (Six) Hours As Needed for mild pain (1-3).     • allopurinol (ZYLOPRIM) 300 MG tablet Take 300 mg by mouth Daily.     • aspirin 81 MG chewable tablet Chew 81 mg Every Other Day.     • carvedilol  "(COREG) 3.125 MG tablet Take 3.125 mg by mouth 2 (Two) Times a Day With Meals.     • diclofenac (VOLTAREN) 3 % gel gel Apply  topically to the appropriate area as directed 2 (Two) Times a Day. 100 g 1   • indomethacin SR (INDOCIN SR) 75 MG CR capsule Take 75 mg by mouth 2 (Two) Times a Day.  2   • magnesium oxide (MAGOX) 400 (241.3 MG) MG tablet tablet 400 mg Daily.  11   • MethylPREDNISolone (MEDROL, AMELIA,) 4 MG tablet Take as directed 21 tablet 0   • pantoprazole (PROTONIX) 40 MG EC tablet Take 40 mg by mouth Daily.     • tamsulosin (FLOMAX) 0.4 MG capsule 24 hr capsule Take 1 capsule by mouth Every Other Day.     • valsartan-hydrochlorothiazide (DIOVAN-HCT) 80-12.5 MG per tablet Take 1 tablet by mouth Daily.     • azithromycin (ZITHROMAX Z-AMELIA) 250 MG tablet Take 2 tablets the first day, then 1 tablet daily for 4 days. 6 tablet 0   • benzonatate (TESSALON) 200 MG capsule Take 1 capsule by mouth 3 (Three) Times a Day As Needed for Cough. 30 capsule 0   • doxycycline (VIBRAMYCIN) 100 MG capsule Take 1 capsule by mouth 2 (Two) Times a Day. 20 capsule 0   • promethazine-dextromethorphan (PROMETHAZINE-DM) 6.25-15 MG/5ML syrup Take 5 mL by mouth At Night As Needed for Cough. 120 mL 0     No current facility-administered medications for this visit.        Review of Systems   Constitutional: Negative.    HENT: Negative.    Eyes: Negative.    Respiratory: Negative.    Cardiovascular: Negative.    Gastrointestinal: Negative.    Musculoskeletal:        Left foot pain     Skin: Negative.    Neurological: Negative.    Psychiatric/Behavioral: Negative.          OBJECTIVE    Pulse 70   Ht 181.6 cm (71.5\")   Wt 106 kg (233 lb)   SpO2 98%   BMI 32.04 kg/m²       Physical Exam   Constitutional: He is oriented to person, place, and time. He appears well-developed and well-nourished. No distress.   HENT:   Head: Normocephalic and atraumatic.   Nose: Nose normal.   Pulmonary/Chest: Effort normal. No respiratory distress. He has no " wheezes.   Musculoskeletal: Normal range of motion. He exhibits tenderness. He exhibits no deformity.   Neurological: He is alert and oriented to person, place, and time. He displays normal reflexes.   Psychiatric: He has a normal mood and affect. His behavior is normal.   Vitals reviewed.      Gait: Normal    Assistive Device: None    Left Lower Extremity    Cardiovascular:    DP/PT pulses palpable    CFT brisk  to all digits  Skin temp is warm to warm from proximal tibia to distal digits    Pedal hair growth present.   Musculoskeletal:  Muscle strength is 5/5 for all muscle groups tested   ROM of the 1st MTP is wnl without pain   ROM of the ankle joint is full without pain or crepitus     Pain on palpation to the medial tubercle of the left calcaneus.  Negative lateral squeeze test.  Dermatological:   Skin is warm, dry and intact    Webspaces 1-4  are clean, dry and intact.   No subcutaneous nodules or masses noted    No open wounds noted   Neurological:   Protective sensation intact   Sensation intact to light touch        Procedures    Plantar Fasciits Injection  Date/Time: 05/04/2020  Performed by: REAL BRANDON  Authorized by: REAL BRANDON   Consent: Verbal consent obtained. Written consent obtained.  Risks and benefits: risks, benefits and alternatives were discussed  Consent given by: patient  Patient identity confirmed: verbally with patient  Indications: pain relief  Nerve block body site: left  heel.  Sedation:  Patient sedated: no    Patient position: sitting  Needle size: 27 G  Local anesthetic: 0.5% Marcaine plain, Kenalog 40 mg/ml , Decadron 4 mg/mL.   Outcome: pain improved  Patient tolerance: Patient tolerated the procedure well with no immediate complications     ASSESSMENT AND PLAN    Raleigh was seen today for plantar fasciitis and pain.    Diagnoses and all orders for this visit:    Plantar fasciitis    Left foot pain      - Comprehensive foot and ankle exam performed  -Steroid injection left  heel  - Patient advised to stretch, ice and to make appropriate shoe gear changes to include wearing athletic type shoes with supportive insoles. Patient was given written instructions on how to correctly perform the stretching of the Achilles tendon/calf stretches, and the heel spur/plantar fasciitis regimen. Limit bare foot walking.    - Recommended over-the-counter insole such as power steps, spenco or walk fit  to properly support the arch in order to alleviate the tension and stress on the plantar fascia associated with normal daily walking. Patient was educated on the break-in period for new arch supports.  - All questions were answered to the patient's satisfaction.  - RTC in 6-8 weeks             This document has been electronically signed by Tevin Rowley DPM on May 4, 2020 16:20     5/4/2020  16:20

## 2020-05-07 RX ORDER — TRIAMCINOLONE ACETONIDE 40 MG/ML
10 INJECTION, SUSPENSION INTRA-ARTICULAR; INTRAMUSCULAR ONCE
Status: COMPLETED | OUTPATIENT
Start: 2020-05-07 | End: 2020-05-04

## 2020-05-07 RX ORDER — DEXAMETHASONE SODIUM PHOSPHATE 4 MG/ML
2 INJECTION, SOLUTION INTRA-ARTICULAR; INTRALESIONAL; INTRAMUSCULAR; INTRAVENOUS; SOFT TISSUE ONCE
Status: COMPLETED | OUTPATIENT
Start: 2020-05-07 | End: 2020-05-04

## 2020-05-07 RX ORDER — BUPIVACAINE HYDROCHLORIDE 5 MG/ML
1 INJECTION, SOLUTION EPIDURAL; INTRACAUDAL ONCE
Status: COMPLETED | OUTPATIENT
Start: 2020-05-07 | End: 2020-05-04

## 2022-08-30 ENCOUNTER — HOSPITAL ENCOUNTER (OUTPATIENT)
Age: 82
Setting detail: SPECIMEN
Discharge: HOME | End: 2022-08-30
Payer: MEDICARE

## 2022-08-30 PROCEDURE — 88305 TISSUE EXAM BY PATHOLOGIST: CPT

## 2024-10-02 PROCEDURE — 88305 TISSUE EXAM BY PATHOLOGIST: CPT | Performed by: SURGERY

## 2024-10-03 ENCOUNTER — LAB REQUISITION (OUTPATIENT)
Dept: LAB | Facility: HOSPITAL | Age: 84
End: 2024-10-03
Payer: MEDICARE

## 2024-10-03 DIAGNOSIS — D48.5 NEOPLASM OF UNCERTAIN BEHAVIOR OF SKIN: ICD-10-CM

## 2024-10-04 LAB
CYTO UR: NORMAL
LAB AP CASE REPORT: NORMAL
LAB AP CLINICAL INFORMATION: NORMAL
Lab: NORMAL
PATH REPORT.FINAL DX SPEC: NORMAL
PATH REPORT.GROSS SPEC: NORMAL

## 2025-01-22 PROCEDURE — 88305 TISSUE EXAM BY PATHOLOGIST: CPT | Performed by: SURGERY

## 2025-01-27 ENCOUNTER — LAB REQUISITION (OUTPATIENT)
Dept: LAB | Facility: HOSPITAL | Age: 85
End: 2025-01-27
Payer: MEDICARE

## 2025-01-27 DIAGNOSIS — D48.5 NEOPLASM OF UNCERTAIN BEHAVIOR OF SKIN: ICD-10-CM

## 2025-01-29 LAB
CYTO UR: NORMAL
LAB AP CASE REPORT: NORMAL
LAB AP CLINICAL INFORMATION: NORMAL
Lab: NORMAL
PATH REPORT.FINAL DX SPEC: NORMAL
PATH REPORT.GROSS SPEC: NORMAL

## (undated) DEVICE — CANN SMPL SOFTECH BIFLO ETCO2 A/M 7FT